# Patient Record
Sex: MALE | Race: OTHER | HISPANIC OR LATINO | Employment: FULL TIME | URBAN - METROPOLITAN AREA
[De-identification: names, ages, dates, MRNs, and addresses within clinical notes are randomized per-mention and may not be internally consistent; named-entity substitution may affect disease eponyms.]

---

## 2018-08-07 ENCOUNTER — HOSPITAL ENCOUNTER (EMERGENCY)
Facility: HOSPITAL | Age: 28
Discharge: HOME | End: 2018-08-07

## 2018-08-07 VITALS
OXYGEN SATURATION: 97 % | TEMPERATURE: 99 F | HEART RATE: 62 BPM | RESPIRATION RATE: 16 BRPM | WEIGHT: 176.6 LBS | DIASTOLIC BLOOD PRESSURE: 83 MMHG | SYSTOLIC BLOOD PRESSURE: 163 MMHG

## 2018-08-07 DIAGNOSIS — R51.9 HEADACHE: Primary | ICD-10-CM

## 2018-08-07 DIAGNOSIS — R11.2 NAUSEA & VOMITING: ICD-10-CM

## 2018-08-07 LAB
ALBUMIN SERPL BCP-MCNC: 3.9 G/DL (ref 3.5–5)
ALP SERPL-CCNC: 88 U/L (ref 46–116)
ALT SERPL W P-5'-P-CCNC: 37 U/L (ref 12–78)
ANION GAP SERPL CALCULATED.3IONS-SCNC: 8 MMOL/L (ref 4–13)
AST SERPL W P-5'-P-CCNC: 23 U/L (ref 5–45)
BASOPHILS # BLD AUTO: 0.02 THOUSANDS/ΜL (ref 0–0.1)
BASOPHILS NFR BLD AUTO: 0 % (ref 0–1)
BILIRUB SERPL-MCNC: 0.59 MG/DL (ref 0.2–1)
BUN SERPL-MCNC: 21 MG/DL (ref 5–25)
CALCIUM SERPL-MCNC: 8.9 MG/DL (ref 8.3–10.1)
CHLORIDE SERPL-SCNC: 102 MMOL/L (ref 100–108)
CO2 SERPL-SCNC: 29 MMOL/L (ref 21–32)
CREAT SERPL-MCNC: 1.16 MG/DL (ref 0.6–1.3)
EOSINOPHIL # BLD AUTO: 0.16 THOUSAND/ΜL (ref 0–0.61)
EOSINOPHIL NFR BLD AUTO: 2 % (ref 0–6)
ERYTHROCYTE [DISTWIDTH] IN BLOOD BY AUTOMATED COUNT: 12.8 % (ref 11.6–15.1)
GFR SERPL CREATININE-BSD FRML MDRD: 86 ML/MIN/1.73SQ M
GLUCOSE SERPL-MCNC: 85 MG/DL (ref 65–140)
HCT VFR BLD AUTO: 42.3 % (ref 36.5–49.3)
HGB BLD-MCNC: 14.2 G/DL (ref 12–17)
LYMPHOCYTES # BLD AUTO: 2.23 THOUSANDS/ΜL (ref 0.6–4.47)
LYMPHOCYTES NFR BLD AUTO: 30 % (ref 14–44)
MCH RBC QN AUTO: 29.3 PG (ref 26.8–34.3)
MCHC RBC AUTO-ENTMCNC: 33.6 G/DL (ref 31.4–37.4)
MCV RBC AUTO: 87 FL (ref 82–98)
MONOCYTES # BLD AUTO: 0.51 THOUSAND/ΜL (ref 0.17–1.22)
MONOCYTES NFR BLD AUTO: 7 % (ref 4–12)
NEUTROPHILS # BLD AUTO: 4.47 THOUSANDS/ΜL (ref 1.85–7.62)
NEUTS SEG NFR BLD AUTO: 60 % (ref 43–75)
PLATELET # BLD AUTO: 270 THOUSANDS/UL (ref 149–390)
PMV BLD AUTO: 10.7 FL (ref 8.9–12.7)
POTASSIUM SERPL-SCNC: 3.7 MMOL/L (ref 3.5–5.3)
PROT SERPL-MCNC: 7.7 G/DL (ref 6.4–8.2)
RBC # BLD AUTO: 4.84 MILLION/UL (ref 3.88–5.62)
SODIUM SERPL-SCNC: 139 MMOL/L (ref 136–145)
WBC # BLD AUTO: 7.39 THOUSAND/UL (ref 4.31–10.16)

## 2018-08-07 PROCEDURE — 36415 COLL VENOUS BLD VENIPUNCTURE: CPT | Performed by: PHYSICIAN ASSISTANT

## 2018-08-07 PROCEDURE — 99283 EMERGENCY DEPT VISIT LOW MDM: CPT

## 2018-08-07 PROCEDURE — 85025 COMPLETE CBC W/AUTO DIFF WBC: CPT | Performed by: PHYSICIAN ASSISTANT

## 2018-08-07 PROCEDURE — 96375 TX/PRO/DX INJ NEW DRUG ADDON: CPT

## 2018-08-07 PROCEDURE — 96374 THER/PROPH/DIAG INJ IV PUSH: CPT

## 2018-08-07 PROCEDURE — 80053 COMPREHEN METABOLIC PANEL: CPT | Performed by: PHYSICIAN ASSISTANT

## 2018-08-07 PROCEDURE — 96361 HYDRATE IV INFUSION ADD-ON: CPT

## 2018-08-07 RX ORDER — ONDANSETRON 2 MG/ML
4 INJECTION INTRAMUSCULAR; INTRAVENOUS ONCE
Status: COMPLETED | OUTPATIENT
Start: 2018-08-07 | End: 2018-08-07

## 2018-08-07 RX ORDER — DIPHENHYDRAMINE HYDROCHLORIDE 50 MG/ML
25 INJECTION INTRAMUSCULAR; INTRAVENOUS ONCE
Status: COMPLETED | OUTPATIENT
Start: 2018-08-07 | End: 2018-08-07

## 2018-08-07 RX ORDER — KETOROLAC TROMETHAMINE 30 MG/ML
15 INJECTION, SOLUTION INTRAMUSCULAR; INTRAVENOUS ONCE
Status: COMPLETED | OUTPATIENT
Start: 2018-08-07 | End: 2018-08-07

## 2018-08-07 RX ADMIN — DIPHENHYDRAMINE HYDROCHLORIDE 25 MG: 50 INJECTION, SOLUTION INTRAMUSCULAR; INTRAVENOUS at 16:19

## 2018-08-07 RX ADMIN — ONDANSETRON 4 MG: 2 INJECTION INTRAMUSCULAR; INTRAVENOUS at 16:22

## 2018-08-07 RX ADMIN — KETOROLAC TROMETHAMINE 15 MG: 30 INJECTION, SOLUTION INTRAMUSCULAR at 16:21

## 2018-08-07 RX ADMIN — SODIUM CHLORIDE 1000 ML: 0.9 INJECTION, SOLUTION INTRAVENOUS at 16:15

## 2018-08-07 NOTE — DISCHARGE INSTRUCTIONS
Dolor de kevin elsa   LO QUE NECESITA SABER:   El dolor de [place] elsa es un dolor o molestia que comienza de ruth y CHRIS rápidamente  Usted puede tener un dolor de kevin elsa sólo cuando siente estrés o come ciertos alimentos  Otro tipo dolor de kevin elsa puede producirse todos los días y a veces varias veces al día  INSTRUCCIONES SOBRE EL AMARILIS HOSPITALARIA:   Regrese a la ben de emergencias si:   · Usted tiene dolor intenso  · Usted tiene entumecimiento en un lado de kelly claudy o cuerpo  · Usted tiene un dolor de kevin que ocurre después de un golpe en la kevin, abraham caída u otro trauma  · Tiene dolor de [place], está olvidadizo o confundido o tiene dificultad para hablar  · Tiene dolor de [place], rigidez en el alex y [place]  Pregúntele a kelly [place] vitaminas y minerales son adecuados para usted  · Usted tiene un dolor de kevin rachel y está vomitando  · Usted tiene dolor de kevin todos los días y no se [place] aun después de tratarlo  · Jerri cely de [facility] u ocurren nuevos síntomas cuando tiene dolor de [place]  · Usted tiene preguntas o inquietudes acerca de kelly condición o cuidado  Medicamentos:  Es posible que usted necesite alguno de los siguientes:  · Un medicamento con receta para el dolor  podrían ser Albina Sample  El medicamento que recomienda kelly médico dependerá del tipo de dolor de kevin que tenga  Usted necesitará horace medicamentos para el dolor de kevin según las indicaciones para evitar un problema llamado dolor de kevin de rebote  Estos cely de [place] ocurren con el uso regular de analgésicos para los trastornos de dolor de [place]  · AINEs (Analgésicos antiinflamatorios no esteroides) dean el ibuprofeno, ayudan a disminuir la inflamación, el dolor y la [place]  Joselyn medicamento esta disponible con o sin abraham receta médica  Los AINEs pueden causar sangrado estomacal o problemas renales en ciertas personas   Si usted martin un medicamento anticoagulante, siempre pregúntele a kelly médico si los NIYAH son seguros para usted  Siempre stephenie la etiqueta de ana medicamento y [place] instrucciones  · El acetaminofén  [place] el dolor y baja la fiebre  Está disponible sin receta médica  Pregunte la cantidad y la frecuencia con que debe tomarlos  [address]  Stephenie las etiquetas de todos los demás medicamentos que esté usando para saber si también contienen acetaminofén, o pregunte a kelly médico o farmacéutico  El acetaminofén puede causar daño en el hígado cuando no se martin de forma correcta  No use más de 3 gramos (3,000 miligramos) en total de acetaminofeno en un día  · Antidepresivos  se pueden administrar para algunos tipos de cely de [place]  · [place] glen medicamentos dean se le haya indicado  Consulte con kelly médico si usted srinath que kelly medicamento no le está ayudando o si presenta efectos secundarios  Infórmele si es alérgico a cualquier medicamento  Mantenga abraham lista actualizada de los [place], las vitaminas y los productos herbales que martin  Incluya los siguientes datos de los medicamentos: cantidad, frecuencia y motivo de administración  Traiga con usted la lista o los envases de la píldoras a glen citas de seguimiento  Lleve la lista de los medicamentos con usted en [place] de abraham emergencia  El manejo de kelly síntomas:   · Aplique hielo o calor  en la juanpablo donde kelly hijo siente el dolor de kevin  Utilice un paquete (compresa) de hielo o calor  Para un paquete de hielo, también puede colocar hielo molido en abraham bolsa plástica  Cubra el paquete de hielo o la bolsa con abraham toalla pequeña antes de aplicarla en la piel  Tanto el hielo dean el calor ayudan a reducir el dolor, y el calor también contribuye a reducir los [employer]  Aplique calor shahzad 20 a 30 minutos cada 2 horas  Aplique hielo shahzad 15 a 20 minutos cada hora  Aplique calor o hielo shahzad el tiempo y la cantidad de días que se le indique   Usted puede alternar el calor y el hielo  · Relaje glen músculos  Acuéstese en abraham posición cómoda y cierre glen ojos  Relaje glen músculos lentamente  Comience por los dedos de los pies y avance hacia arriba al ashley de kelly cuerpo  · Registre en un diario glen cely de [place]  Escriba cuándo comienzan y terminan glen migrañas  Henrry [place] y qué estaba haciendo cuando comenzó la migraña  Registre lo que comió y lo que tomó las 24 horas previas al comienzo de kelly migraña  Kirti Kerbs dolor y dónde le duele: Lleve un registro de lo que hizo para tratar kelly Juan Graver y si obtuvo un resultado satisfactorio  Cómo prevenir un dolor de kevin elsa:   · Evite cualquier cosa que provoque un dolor de kevin elsa  Los ejemplos incluyen la exposición a sustancias químicas, las grandes altitudes o no dormir lo suficiente  Cecilia abraham rutina para dormir  Acuéstese y [employer] días a la misma hora  No utilice aparatos electrónicos antes de acostarse  Pueden provocarle un dolor de kevin o impedirle dormir yoli  · No fume  La nicotina y otras sustancias químicas en los cigarrillos y puros pueden desencadenar un dolor de kevin elsa o [place]  Pida información a kelly médico si usted actualmente fuma y necesita ayuda para dejar de fumar  Los cigarrillos electrónicos o tabaco sin humo todavía contienen nicotina  Consulte con kelly médico antes de [employer]  · Limite el consumo de alcohol según le indicaron  El alcohol puede provocar un dolor de kevin elsa o empeorarlo  Si usted tiene cely de [place] de racimo, no bianca alcohol shahzad un episodio  Para otros tipos de cely de [place], pregúntele a kelly proveedor de atención médica si es seguro para usted beber alcohol  Pregunte cuál es la cantidad robles que puede beber y con qué frecuencia  · Ejercítese según indicaciones  El ejercicio puede reducir la tensión y [place] a aliviar el dolor de [place]   Propóngase hacer 30 minutos de [place] todos los días de la [place]  Meza médico puede ayudarle a crear un plan de ejercicios  · Consuma alimentos saludables y variados  [address] frutas, verduras, productos lácteos bajos en grasa, ruchi [place], pescado y frijoles cocidos  Meza médico o dietista puede ayudarle a crear planes de comidas si desea evitar los alimentos que provocan cely de [place]  Acuda a glen consultas de control con meza médico según le indicaron  Traiga meza registro de cely de kevin con usted cuando visite a meza médico  Anote glen preguntas para que se acuerde de hacerlas shahzad glen visitas  © 2017 [address] Information is for End User's use only and may not be sold, redistributed or otherwise used for commercial purposes  All illustrations and images included in CareNotes® are the copyrighted property of A D A M  Inc  or Adrian Morrison  Esta información es sólo para uso en educación  Meza intención no es darle un consejo médico sobre enfermedades o tratamientos  Colsulte con meza Star Jackie farmacéutico antes de seguir cualquier régimen médico para saber si es seguro y efectivo para usted  Náuseas y vómitos agudos   LO QUE NECESITA SABER:   Las náuseas y los vómitos agudos comienzan de forma repentina, [place] rápidamente y machuca un período breve de [place]  INSTRUCCIONES SOBRE EL AMARILIS HOSPITALARIA:   Regrese a la ben de emergencias si:   · Usted nota igor en meza vómito o en glen evacuaciones  · Usted siente un dolor súbito e intenso en el pecho y la parte superior de meza abdomen después de tener vómitos arminda o tratar de vomitar  · Usted tiene el alex y el pecho inflamados  · [employer], tiene frío, sed y sequedad en los ojos y la boca  · Usted está orinando muy poco o nada en absoluto  · Usted tiene debilidad muscular, calambres en las piernas y dificultad para respirar  · Meza corazón late más rápido que de costumbre       · Usted continúa vomitando por más de 48 horas  Pregúntele a kelly Melvina Hue vitaminas y minerales son adecuados para usted  · Usted tiene arcadas secas (vómitos sin que salga nada) frecuentes  · [place] náusea y vómitos no mejoran ni desaparecen después de usar el medicamento  · Usted tiene preguntas o inquietudes acerca de kelly condición o tratamiento  Medicamentos:  Es posible que usted necesite alguno de los siguientes:  · Medicamentos,  se puede administrar para calmarle el estómago y detener glen vómitos  Usted también puede necesitar medicamentos para ayudarlo a sentirse más relajado o para detener las náuseas y los vómitos causados por el mareo por movimiento  · Se usan los estimulantes gastrointestinales  para ayudar a vaciar kelly estómago y los intestinos  Como puede ayudar para reducir las náuseas y el vómito  · [place] glen medicamentos dean se le haya indicado  Consulte con kelly médico si usted srinath que kelly medicamento no le está ayudando o si presenta efectos secundarios  Infórmele si es alérgico a cualquier medicamento  Mantenga abraham lista actualizada de los [employer], las vitaminas y los productos herbales que martin  Incluya los siguientes datos de los medicamentos: cantidad, frecuencia y motivo de administración  Traiga con usted la lista o los envases de la píldoras a glen citas de seguimiento  Lleve la lista de los medicamentos con usted en ind[place] de abraham emergencia  Evite o controle las náuseas y los vómitos agudos:   · No consuma alcohol  El alcohol podría causarle malestar o irritación estomacal  Abraham cantidad elevada de alcohol también puede causar náuseas y vómitos agudos  · Controle el estrés  Los cely de [place] que son el resultado de tensión nerviosa pueden causar náuseas y vómitos  Busque la manera de relajarse y controlar el estrés  Descanse y [employer]  · [employer] líquidos dean se le indique  Los vómitos pueden llevar a la deshidratación   Es importante beber más líquidos para ayudar a reemplazar los fluidos corporales perdidos  Pregunte a meza médico sobre la cantidad de líquido que necesita horace todos los días y cuáles le recomienda  Meza médico podría recomendarle que tome abraham solución de rehidratación oral (SRO)  Las soluciones de rehidratación oral contienen la cantidad [facility] [place], sales y azúcar que necesita para restituir los líquidos que perdió meza organismo  Pregunte qué tipo de solución de rehidratación oral debe usar, qué cantidad debe horace y dónde puede obtenerla  · Ingiera comidas más pequeñas, más a menudo  Coma pequeñas cantidades de comida cada 2 o 3 horas, incluso si no tiene hambre  Jerri náuseas podrían disminuir si tiene comida en el estómago  · Hable con meza médico antes de horace medicamentos de [facility]  Estos medicamentos pueden causar problemas serios si los [place] junto con ciertos medicamentos o si tiene determinadas condiciones médicas  Podrían tener problemas si [place] abraham dosis demasiado ludin o los [place] shahzad más tiempo de lo indicado  Siga las indicaciones de la etiqueta al pie de la [place]  Acuda a jerir consultas de control con meza médico según le indicaron  Anote las preguntas que tenga para no olvidarse de [employer] visitas de seguimiento  © 2017 [address] Information is for End User's use only and may not be sold, redistributed or otherwise used for commercial purposes  All illustrations and images included in CareNotes® are the copyrighted property of A D A M , Inc  or Adrian Morrison  Esta información es sólo para uso en educación  Meza intención no es darle un consejo médico sobre enfermedades o tratamientos  Colsulte con meza Laura Gastelum farmacéutico antes de seguir cualquier régimen médico para saber si es seguro y efectivo para usted  [place] tensional   LO QUE NECESITA SABER:   Los cely de kevin por tensión son por lo general el resultado del estrés, de forzar la vista o de tensión muscular   El dolor podría comenzar en la frente o la parte posterior de la kevin  A menudo el dolor se propaga a toda la kevin, al alex y los hombros  Los medicamentos de [place] son el tratamiento más útil y común para los cely de kevin por tensión  Es posible que el ejercicio, la retroalimentación biológica, la meditación y las técnicas de relajación también contribuyan a calmar el dolor  INSTRUCCIONES SOBRE EL AMARILIS HOSPITALARIA:   Regrese a la ben de emergencias si:   · Usted tiene dolor de kevin repentino que parece diferente o peor que jerri cely de kevin habituales  · Usted tiene dificultad para maia, hablar o moverse  · Usted se desmaya, se siente confundido o tiene abraham convulsión  · Usted tiene dolor de [place], [place] y rigidez en el alex  Pregúntele a kelly Krista Remedies vitaminas y minerales son adecuados para usted  · Jerri cely de [place] siguen empeorando  · Los cely de [place] son tan frecuentes que interfieren con kelly capacidad para hacer kelly trabajo o jerri actividades normales  · Usted debe horace medicamento para los cely de kevin con mayor frecuencia de la indicada por kelly médico     · El dolor de kevin es tan intenso que le causa vómitos  · Usted tiene preguntas o inquietudes acerca de kelly condición o cuidado  Medicamentos:   · AINEs (Analgésicos antiinflamatorios no esteroides) dean el ibuprofeno, ayudan a disminuir la inflamación, el dolor y la fiebre  Joselyn medicamento esta disponible con o sin abraham receta médica  Los AINEs pueden causar sangrado estomacal o problemas renales en ciertas personas  Si usted martin un medicamento anticoagulante, siempre pregúntele a kelly médico si los NIYAH son seguros para usted  Siempre tiarra la etiqueta de joselyn medicamento y [place] instrucciones  · Acetaminofeno:  tyler el dolor y baja la fiebre  Está disponible sin receta médica  Pregunte la cantidad y la frecuencia con que debe tomarlos  [address]   Tiarra las etiquetas de [employer] demás medicamentos que esté usando para saber si también contienen acetaminofén, o pregunte a kelly médico o farmacéutico  El acetaminofén puede causar daño en el hígado cuando no se martin de forma correcta  No use más de 4 gramos (4000 miligramos) en total de acetaminofeno en un día  · [place] glen medicamentos dean se le haya indicado  Consulte con kelly médico si usted srinath que kelly medicamento no le está ayudando o si presenta efectos secundarios  Infórmele si es alérgico a algún medicamento  Mantenga abraham lista actualizada de los [employer], las vitaminas y los productos herbales que martin  Incluya los siguientes datos de los medicamentos: cantidad, frecuencia y motivo de administración  Traiga con usted la lista o los envases de la píldoras a glen citas de seguimiento  Lleve la lista de los medicamentos con usted en india de abraham emergencia  [place] de kelly síntomas:   · Mantenga un registro de glen cely de [place]  Cathlean Ole y [employer] cely de [place] y [employer]  Describa glen síntomas, cómo se siente el dolor, dónde es, y kelly severidad  Registre todo lo que comió o tomó shahzad las 24 horas antes de que comenzara el dolor de [place]  Julian Flemingch kelly registro a glen citas  · Aplique calor según indicaciones  El calor podría calmar el dolor de kevin y los espasmos musculares  Aplíquese calor en el área lesionada shahzad 20 a 30 minutos cada 2 horas shahzad la cantidad de [employer] indiquen  Un baño caliente también puede ayudar a aliviar la tensión muscular y los espasmos  · Aplique hielo según las indicaciones  Es posible que el hielo le alivie el dolor de [place]  Use abraham bolsa con hielo o ponga hielo triturado en abraham bolsa de plástico  Jearld Keen con abraham toalla y aplíquelo sobre el área por 15 a 20 minutos cada hora según indicaciones  Evite los cely de kevin por tensión:   · Evite la tensión muscular  No permanezca en la misma posición shahzad un período prolongado de [place]  Use abraham almohada diferente si se despierta con dolor en los músculos del alex y del hombro  Busque abraham manera de [employer], dean darse un masaje o descansar en abraham habitación tranquila y Kenney Dodrill  · Evite forzar la vista  Asegúrese de tener suficiente sarah para leer, coser o realizar actividades similares  Hágase exámenes de la vista anuales y use glen lentes dean se le indique  · Duerma lo suficiente  Duerma entre 8 y 10 horas cada noche  Establezca un horario para dormir  Acuéstese y levántese a la misma hora todos los días  Puede resultarle útil hacer algo relajante antes de acostarse  No amanda televisión antes de acostarse  · Consuma alimentos saludables y variados  Los alimentos saludables incluyen frutas, verduras, pan integral, productos lácteos bajos en grasa, frijoles, ruchi magras y pescado  No coma alimentos que le provoquen cely de [place]  · Realice actividad física con regularidad  El ejercicio ayuda a aliviar la tensión y los cely de [place]  Pida más información acerca de un plan de ejercicio adecuado para usted  · [address] se le haya indicado  Es probable que usted necesite horace más líquido para prevenir la deshidratación  La deshidratación puede empeorar el dolor de kevin tensional  Pregúntele al médico cuánto líquido debe horace y qué líquidos son mejores para usted  Limite el consumo de cafeína dean se le haya indicado  La cafeína puede empeorar el dolor de kevin tensional     · No consuma alcohol  El alcohol puede desencadenar un dolor de [place]  También puede impedir que los medicamentos detengan kelly dolor de [place]  · No fume  La nicotina y otros químicos en los cigarrillos o cigarros pueden desencadenar un dolor de [place] y [place] pueden provocar daño al pulmón  Pida información a kelly médico si usted actualmente fuma y necesita ayuda para dejar de fumar  Los cigarrillos electrónicos o tabaco sin humo todavía contienen nicotina   Consulte con kelly médico antes de [employer]  Acuda a glen consultas de control con meza médico según le indicaron  Traiga el registro de cely de kevin con usted  Anote glen preguntas para que se acuerde de hacerlas shahzad glen visitas  © 2017 [address] Information is for End User's use only and may not be sold, redistributed or otherwise used for commercial purposes  All illustrations and images included in CareNotes® are the copyrighted property of A D A M , Inc  or Adrian Morrison  Esta información es sólo para uso en educación  Meza intención no es darle un consejo médico sobre enfermedades o tratamientos  Colsulte con meza [place] farmacéutico antes de seguir cualquier régimen médico para saber si es seguro y efectivo para usted

## 2018-08-07 NOTE — ED PROVIDER NOTES
33 y o  Male presents with c o  Headache intermittently for 2 days, multiple episodes of vomiting yesterday with associated nausea  No vomiting today, able to tolerate po  Denies fevers, chills, blurry vision, numbness, tingling, history of migraines or known sick contacts  Does note several areas of "rash or maybe bug bites "   History  Chief Complaint   Patient presents with    Headache     headache started 2 days ago  vomiting yesterday  denies fever/chills  also reports scattered rash  HPI    None       History reviewed  No pertinent past medical history  Past Surgical History:   Procedure Laterality Date    APPENDECTOMY         History reviewed  No pertinent family history  I have reviewed and agree with the history as documented  Social History   Substance Use Topics    Smoking status: Never Smoker    Smokeless tobacco: Never Used    Alcohol use No        Review of Systems   Skin: Positive for rash  Neurological: Positive for headaches  All other systems reviewed and are negative  Physical Exam  Physical Exam   Constitutional: He is oriented to person, place, and time  He appears well-developed and well-nourished  HENT:   Head: Normocephalic and atraumatic  Nose: Nose normal    Mouth/Throat: Oropharynx is clear and moist    Eyes: Conjunctivae are normal    Neck: Normal range of motion  Neck supple  Cardiovascular: Normal rate, regular rhythm, normal heart sounds and intact distal pulses  Pulmonary/Chest: Effort normal and breath sounds normal    Abdominal: Soft  Bowel sounds are normal    Musculoskeletal: Normal range of motion  Neurological: He is alert and oriented to person, place, and time  He displays normal reflexes  No cranial nerve deficit or sensory deficit  He exhibits normal muscle tone  Coordination normal    Skin: Skin is warm and dry  Capillary refill takes less than 2 seconds  Psychiatric: He has a normal mood and affect   His behavior is normal  Nursing note and vitals reviewed  Vital Signs  ED Triage Vitals   Temperature Pulse Respirations Blood Pressure SpO2   08/07/18 1531 08/07/18 1531 08/07/18 1531 08/07/18 1531 08/07/18 1531   99 °F (37 2 °C) 81 18 158/87 99 %      Temp src Heart Rate Source Patient Position - Orthostatic VS BP Location FiO2 (%)   -- 08/07/18 1750 08/07/18 1750 08/07/18 1750 --    Monitor Sitting Left arm       Pain Score       08/07/18 1531       4           Vitals:    08/07/18 1531 08/07/18 1750 08/07/18 1800   BP: 158/87 129/58 163/83   Pulse: 81 68 62   Patient Position - Orthostatic VS:  Sitting        Visual Acuity      ED Medications  Medications   sodium chloride 0 9 % bolus 1,000 mL (0 mL Intravenous Stopped 8/7/18 1705)   ketorolac (TORADOL) injection 15 mg (15 mg Intravenous Given 8/7/18 1621)   ondansetron (ZOFRAN) injection 4 mg (4 mg Intravenous Given 8/7/18 1622)   diphenhydrAMINE (BENADRYL) injection 25 mg (25 mg Intravenous Given 8/7/18 1619)       Diagnostic Studies  Results Reviewed     Procedure Component Value Units Date/Time    Comprehensive metabolic panel [[id number]] Collected:  08/07/18 1614    Lab Status:  Final result Specimen:  Blood from Arm, Right Updated:  08/07/18 1719     Sodium 139 mmol/L      Potassium 3 7 mmol/L      Chloride 102 mmol/L      CO2 29 mmol/L      Anion Gap 8 mmol/L      BUN 21 mg/dL      Creatinine 1 16 mg/dL      Glucose 85 mg/dL      Calcium 8 9 mg/dL      AST 23 U/L      ALT 37 U/L      Alkaline Phosphatase 88 U/L      Total Protein 7 7 g/dL      Albumin 3 9 g/dL      Total Bilirubin 0 59 mg/dL      eGFR 86 ml/min/1 73sq m     Narrative:         National Kidney Disease Education Program recommendations are as follows:  GFR calculation is accurate only with a steady state creatinine  Chronic Kidney disease less than 60 ml/min/1 73 sq  meters  Kidney failure less than 15 ml/min/1 73 sq  meters      CBC and differential [[id number]]  (Normal) Collected:  08/07/18 1614    Lab Status: Final result Specimen:  Blood from Arm, Right Updated:  08/07/18 1629     WBC 7 39 Thousand/uL      RBC 4 84 Million/uL      Hemoglobin 14 2 g/dL      Hematocrit 42 3 %      MCV 87 fL      MCH 29 3 pg      MCHC 33 6 g/dL      RDW 12 8 %      MPV 10 7 fL      Platelets 674 Thousands/uL      Neutrophils Relative 60 %      Lymphocytes Relative 30 %      Monocytes Relative 7 %      Eosinophils Relative 2 %      Basophils Relative 0 %      Neutrophils Absolute 4 47 Thousands/µL      Lymphocytes Absolute 2 23 Thousands/µL      Monocytes Absolute 0 51 Thousand/µL      Eosinophils Absolute 0 16 Thousand/µL      Basophils Absolute 0 02 Thousands/µL                  No orders to display              Procedures  Procedures       Phone Contacts  ED Phone Contact    ED Course                               MDM  Number of Diagnoses or Management Options  Headache: new and requires workup  Nausea & vomiting: minor  Diagnosis management comments: Pt  Is A&Ox3, VSS, afebrile, NAD, non-toxic appearing,  PERRLA, EOMI, neck supple, LS CTA B/L, RRR, abd soft NT/ND, cap refill brisk, no le edema  Will check labs, give 1 L IVF, migraine cocktail and reasess  Pt is feeling much better would like to go home  Will DC home return precautions discussed pt verbalized understanding  Amount and/or Complexity of Data Reviewed  Clinical lab tests: ordered and reviewed      CritCare Time    Disposition  Final diagnoses:   Headache   Nausea & vomiting     Time reflects when diagnosis was documented in both MDM as applicable and the Disposition within this note     Time User Action Codes Description Comment    8/7/2018  5:54 PM Jaylyn Sai Add [R51] Headache     8/7/2018  5:54 PM Jaylyn Sai Add [R11 2] Nausea & vomiting       ED Disposition     ED Disposition Condition Comment    Discharge  Laina Longorialer discharge to home/self care      Condition at discharge: Improved      Follow-up Information     Follow up With Specialties Details Why Contact Info    [facility]    [phone]            There are no discharge medications for this patient  No discharge procedures on file      ED Provider  Electronically Signed by           Creighton Boxer, PA-C  08/09/18 9[facility] AUBREE Gale  08/09/18 [id number]

## 2021-03-06 ENCOUNTER — HOSPITAL ENCOUNTER (EMERGENCY)
Facility: HOSPITAL | Age: 31
Discharge: HOME | End: 2021-03-06
Attending: EMERGENCY MEDICINE

## 2021-03-06 VITALS
HEART RATE: 94 BPM | OXYGEN SATURATION: 98 % | SYSTOLIC BLOOD PRESSURE: 165 MMHG | DIASTOLIC BLOOD PRESSURE: 82 MMHG | WEIGHT: 190.04 LBS | TEMPERATURE: 100 F | RESPIRATION RATE: 16 BRPM

## 2021-03-06 DIAGNOSIS — Z20.822 ENCOUNTER FOR LABORATORY TESTING FOR COVID-19 VIRUS: ICD-10-CM

## 2021-03-06 DIAGNOSIS — J06.9 VIRAL UPPER RESPIRATORY ILLNESS: Primary | ICD-10-CM

## 2021-03-06 LAB — SARS-COV-2 RNA RESP QL NAA+PROBE: POSITIVE

## 2021-03-06 PROCEDURE — 99284 EMERGENCY DEPT VISIT MOD MDM: CPT | Performed by: PHYSICIAN ASSISTANT

## 2021-03-06 PROCEDURE — 99283 EMERGENCY DEPT VISIT LOW MDM: CPT

## 2021-03-06 PROCEDURE — U0003 INFECTIOUS AGENT DETECTION BY NUCLEIC ACID (DNA OR RNA); SEVERE ACUTE RESPIRATORY SYNDROME CORONAVIRUS 2 (SARS-COV-2) (CORONAVIRUS DISEASE [COVID-19]), AMPLIFIED PROBE TECHNIQUE, MAKING USE OF HIGH THROUGHPUT TECHNOLOGIES AS DESCRIBED BY CMS-2020-01-R: HCPCS | Performed by: PHYSICIAN ASSISTANT

## 2021-03-06 PROCEDURE — U0005 INFEC AGEN DETEC AMPLI PROBE: HCPCS | Performed by: PHYSICIAN ASSISTANT

## 2021-03-06 RX ORDER — BENZONATATE 100 MG/1
100 CAPSULE ORAL 3 TIMES DAILY PRN
Qty: 20 CAPSULE | Refills: 0 | Status: SHIPPED | OUTPATIENT
Start: 2021-03-06 | End: 2021-03-13

## 2021-03-06 RX ORDER — ACETAMINOPHEN 325 MG/1
650 TABLET ORAL EVERY 6 HOURS PRN
Qty: 30 TABLET | Refills: 0 | Status: SHIPPED | OUTPATIENT
Start: 2021-03-06 | End: 2021-11-23

## 2021-03-06 RX ORDER — IBUPROFEN 600 MG/1
600 TABLET ORAL EVERY 6 HOURS PRN
Qty: 30 TABLET | Refills: 0 | Status: SHIPPED | OUTPATIENT
Start: 2021-03-06 | End: 2021-11-23

## 2021-03-06 NOTE — ED PROVIDER NOTES
History  Chief Complaint   Patient presents with    Headache     HA and fever subjective fever "felt hot" also pain in feet  also sore throat and cough     +HA x 4 days - taking theraflu and tylenol  Subjective fever yesterday  + cough  No GI upset, no change in smell or taste  No sick contacts  None       History reviewed  No pertinent past medical history  Past Surgical History:   Procedure Laterality Date    APPENDECTOMY         History reviewed  No pertinent family history  I have reviewed and agree with the history as documented  E-Cigarette/Vaping     E-Cigarette/Vaping Substances     Social History     Tobacco Use    Smoking status: Never Smoker    Smokeless tobacco: Never Used   Substance Use Topics    Alcohol use: No    Drug use: No       Review of Systems   Respiratory: Positive for cough  Negative for shortness of breath and wheezing  Cardiovascular: Negative  Gastrointestinal: Negative for diarrhea, nausea and vomiting  Genitourinary: Negative  Neurological: Positive for headaches  All other systems reviewed and are negative  Physical Exam  Physical Exam  Vitals signs and nursing note reviewed  Constitutional:       Appearance: He is well-developed  HENT:      Head: Normocephalic and atraumatic  Right Ear: External ear normal       Left Ear: External ear normal       Mouth/Throat:      Pharynx: Oropharynx is clear  Eyes:      Conjunctiva/sclera: Conjunctivae normal    Neck:      Musculoskeletal: Normal range of motion and neck supple  Cardiovascular:      Rate and Rhythm: Normal rate and regular rhythm  Heart sounds: Normal heart sounds  Pulmonary:      Effort: Pulmonary effort is normal       Breath sounds: Normal breath sounds  Abdominal:      General: Bowel sounds are normal       Palpations: Abdomen is soft  Musculoskeletal: Normal range of motion  Skin:     General: Skin is warm  Findings: No rash     Neurological:      Mental Status: He is alert and oriented to person, place, and time  Motor: No abnormal muscle tone  Coordination: Coordination normal    Psychiatric:         Behavior: Behavior normal          Vital Signs  ED Triage Vitals [03/06/21 1128]   Temperature Pulse Respirations Blood Pressure SpO2   100 °F (37 8 °C) 94 16 165/82 98 %      Temp Source Heart Rate Source Patient Position - Orthostatic VS BP Location FiO2 (%)   Oral -- Sitting Right arm --      Pain Score       --           Vitals:    03/06/21 1128   BP: 165/82   Pulse: 94   Patient Position - Orthostatic VS: Sitting         Visual Acuity      ED Medications  Medications - No data to display    Diagnostic Studies  Results Reviewed     Procedure Component Value Units Date/Time    [id number]  (Abnormal) Collected: 03/06/21 1138    Lab Status: Final result Specimen: Nasopharyngeal Swab Updated: 03/06/21 1248     SARS-CoV-2 Positive    Narrative: The specimen collection materials, transport medium, and/or testing methodology utilized in the production of these test results have been proven to be reliable in a limited validation with an abbreviated program under the Emergency Utilization Authorization provided by the FDA  Testing reported as "Presumptive positive" will be confirmed with secondary testing with a reference laboratory to ensure result accuracy  Clinical caution and judgement should be used with the interpretation of these results with consideration of the clinical impression and other laboratory testing  Testing reported as "Positive" or "Negative" has been proven to be accurate according to standard laboratory validation requirements  All testing is performed with control materials showing appropriate reactivity at standard intervals                     No orders to display              Procedures  Procedures         ED Course                                           MDM  Number of Diagnoses or Management Options  Encounter for laboratory testing for COVID-19 virus: new and requires workup  Viral upper respiratory illness: new and requires workup  Risk of Complications, Morbidity, and/or Mortality  General comments: Called pt LMOM with covid test results    Patient Progress  Patient progress: stable      Disposition  Final diagnoses:   Viral upper respiratory illness   Encounter for laboratory testing for COVID-19 virus     Time reflects when diagnosis was documented in both MDM as applicable and the Disposition within this note     Time User Action Codes Description Comment    3/6/2021 11:38 AM Lissett Maxwell [J06 9] Viral upper respiratory illness     3/6/2021 11:38 AM Lissett Maxwell [Z20 [id number]] Encounter for laboratory testing for COVID-19 virus       ED Disposition     ED Disposition Condition Date/Time Comment    Discharge Stable Sat Mar 6, 2021 11:38 AM Erica Guerrero discharge to home/self care  Follow-up Information     Follow up With Specialties Details Why Contact Info    [facility]    [phone]            Discharge Medication List as of 3/6/2021 11:42 AM      START taking these medications    Details   acetaminophen (TYLENOL) 325 mg tablet Take 2 tablets (650 mg total) by mouth every 6 (six) hours as needed for headaches or fever, Starting Sat 3/6/2021, Normal      benzonatate (TESSALON PERLES) 100 mg capsule Take 1 capsule (100 mg total) by mouth 3 (three) times a day as needed for cough for up to 7 days, Starting Sat 3/6/2021, Until Sat 3/13/2021, Normal      ibuprofen (MOTRIN) 600 mg tablet Take 1 tablet (600 mg total) by mouth every 6 (six) hours as needed for mild pain for up to 10 days, Starting Sat 3/6/2021, Until Tue 3/16/2021, Normal           No discharge procedures on file      PDMP Review     None          ED Provider  Electronically Signed by           Susana Amin PA-C  03/06/21 5549

## 2021-03-06 NOTE — Clinical Note
Julissa Liu was seen and treated in our emergency department on 3/6/2021  Diagnosis:     Perla Nati    He may return on this date: You were tested for COVID today  It is important that you quarantine for your results  Anyone you have been in close contact with should also quarantine  If you have any questions or concerns, please don't hesitate to call        Lissett Maxwell PA-C    ______________________________           _______________          _______________  Hospital Representative                              Date                                Time

## 2021-03-06 NOTE — Clinical Note
Erica Guerrero was seen and treated in our emergency department on 3/6/2021  Diagnosis:     Aram Route    He may return on this date: You were tested for COVID today  It is important that you quarantine for your results  Anyone you have been in close contact with should also quarantine  If you have any questions or concerns, please don't hesitate to call        Lissett Maxwell PA-C    ______________________________           _______________          _______________  Hospital Representative                              Date                                Time

## 2021-03-06 NOTE — DISCHARGE INSTRUCTIONS
You were tested for COVID today  It is important that you quarantine for your results  Anyone you have been in close contact with should also quarantine

## 2021-11-23 ENCOUNTER — HOSPITAL ENCOUNTER (EMERGENCY)
Facility: HOSPITAL | Age: 31
Discharge: HOME | End: 2021-11-23
Attending: EMERGENCY MEDICINE | Admitting: EMERGENCY MEDICINE

## 2021-11-23 VITALS
RESPIRATION RATE: 14 BRPM | TEMPERATURE: 98.2 F | WEIGHT: 182.54 LBS | HEART RATE: 72 BPM | OXYGEN SATURATION: 98 % | DIASTOLIC BLOOD PRESSURE: 86 MMHG | SYSTOLIC BLOOD PRESSURE: 149 MMHG

## 2021-11-23 DIAGNOSIS — J06.9 VIRAL URI WITH COUGH: Primary | ICD-10-CM

## 2021-11-23 LAB
FLUAV RNA RESP QL NAA+PROBE: NEGATIVE
FLUBV RNA RESP QL NAA+PROBE: NEGATIVE
RSV RNA RESP QL NAA+PROBE: NEGATIVE
SARS-COV-2 RNA RESP QL NAA+PROBE: NEGATIVE

## 2021-11-23 PROCEDURE — 99284 EMERGENCY DEPT VISIT MOD MDM: CPT | Performed by: PHYSICIAN ASSISTANT

## 2021-11-23 PROCEDURE — 99283 EMERGENCY DEPT VISIT LOW MDM: CPT

## 2021-11-23 PROCEDURE — 0241U HB NFCT DS VIR RESP RNA 4 TRGT: CPT | Performed by: PHYSICIAN ASSISTANT

## 2021-11-23 RX ORDER — IBUPROFEN 800 MG/1
800 TABLET ORAL EVERY 8 HOURS PRN
Qty: 21 TABLET | Refills: 0 | Status: SHIPPED | OUTPATIENT
Start: 2021-11-23

## 2021-11-23 RX ORDER — IBUPROFEN 400 MG/1
800 TABLET ORAL ONCE
Status: COMPLETED | OUTPATIENT
Start: 2021-11-23 | End: 2021-11-23

## 2021-11-23 RX ADMIN — IBUPROFEN 800 MG: 400 TABLET, FILM COATED ORAL at 17:38

## 2023-05-09 ENCOUNTER — HOSPITAL ENCOUNTER (EMERGENCY)
Facility: HOSPITAL | Age: 33
Discharge: HOME | End: 2023-05-09
Attending: EMERGENCY MEDICINE

## 2023-05-09 ENCOUNTER — APPOINTMENT (EMERGENCY)
Dept: RADIOLOGY | Facility: HOSPITAL | Age: 33
End: 2023-05-09

## 2023-05-09 ENCOUNTER — APPOINTMENT (EMERGENCY)
Dept: CT IMAGING | Facility: HOSPITAL | Age: 33
End: 2023-05-09

## 2023-05-09 VITALS
WEIGHT: 171.52 LBS | HEART RATE: 85 BPM | RESPIRATION RATE: 19 BRPM | DIASTOLIC BLOOD PRESSURE: 85 MMHG | SYSTOLIC BLOOD PRESSURE: 144 MMHG | TEMPERATURE: 98.5 F | OXYGEN SATURATION: 97 %

## 2023-05-09 DIAGNOSIS — R11.10 POST-TUSSIVE EMESIS: ICD-10-CM

## 2023-05-09 DIAGNOSIS — R05.2 SUBACUTE COUGH: ICD-10-CM

## 2023-05-09 DIAGNOSIS — R91.8 PULMONARY NODULES: ICD-10-CM

## 2023-05-09 DIAGNOSIS — R59.0 HILAR ADENOPATHY: Primary | ICD-10-CM

## 2023-05-09 LAB
ANION GAP SERPL CALCULATED.3IONS-SCNC: 10 MMOL/L (ref 4–13)
BASOPHILS # BLD AUTO: 0.04 THOUSANDS/ÂΜL (ref 0–0.1)
BASOPHILS NFR BLD AUTO: 0 % (ref 0–1)
BUN SERPL-MCNC: 9 MG/DL (ref 5–25)
CALCIUM SERPL-MCNC: 9.9 MG/DL (ref 8.4–10.2)
CHLORIDE SERPL-SCNC: 100 MMOL/L (ref 96–108)
CO2 SERPL-SCNC: 25 MMOL/L (ref 21–32)
CREAT SERPL-MCNC: 1.05 MG/DL (ref 0.6–1.3)
EOSINOPHIL # BLD AUTO: 0.17 THOUSAND/ÂΜL (ref 0–0.61)
EOSINOPHIL NFR BLD AUTO: 2 % (ref 0–6)
ERYTHROCYTE [DISTWIDTH] IN BLOOD BY AUTOMATED COUNT: 11.8 % (ref 11.6–15.1)
GFR SERPL CREATININE-BSD FRML MDRD: 94 ML/MIN/1.73SQ M
GLUCOSE SERPL-MCNC: 158 MG/DL (ref 65–140)
HCT VFR BLD AUTO: 45.3 % (ref 36.5–49.3)
HGB BLD-MCNC: 15.4 G/DL (ref 12–17)
IMM GRANULOCYTES # BLD AUTO: 0.05 THOUSAND/UL (ref 0–0.2)
IMM GRANULOCYTES NFR BLD AUTO: 1 % (ref 0–2)
LYMPHOCYTES # BLD AUTO: 1.35 THOUSANDS/ÂΜL (ref 0.6–4.47)
LYMPHOCYTES NFR BLD AUTO: 15 % (ref 14–44)
MCH RBC QN AUTO: 29.6 PG (ref 26.8–34.3)
MCHC RBC AUTO-ENTMCNC: 34 G/DL (ref 31.4–37.4)
MCV RBC AUTO: 87 FL (ref 82–98)
MONOCYTES # BLD AUTO: 0.67 THOUSAND/ÂΜL (ref 0.17–1.22)
MONOCYTES NFR BLD AUTO: 7 % (ref 4–12)
NEUTROPHILS # BLD AUTO: 7.03 THOUSANDS/ÂΜL (ref 1.85–7.62)
NEUTS SEG NFR BLD AUTO: 75 % (ref 43–75)
NRBC BLD AUTO-RTO: 0 /100 WBCS
PLATELET # BLD AUTO: 323 THOUSANDS/UL (ref 149–390)
PMV BLD AUTO: 10 FL (ref 8.9–12.7)
POTASSIUM SERPL-SCNC: 3.3 MMOL/L (ref 3.5–5.3)
RBC # BLD AUTO: 5.21 MILLION/UL (ref 3.88–5.62)
SODIUM SERPL-SCNC: 135 MMOL/L (ref 135–147)
WBC # BLD AUTO: 9.31 THOUSAND/UL (ref 4.31–10.16)

## 2023-05-09 RX ORDER — ALBUTEROL SULFATE 2.5 MG/3ML
5 SOLUTION RESPIRATORY (INHALATION) ONCE
Status: COMPLETED | OUTPATIENT
Start: 2023-05-09 | End: 2023-05-09

## 2023-05-09 RX ORDER — ALBUTEROL SULFATE 90 UG/1
1-2 AEROSOL, METERED RESPIRATORY (INHALATION) EVERY 6 HOURS PRN
Qty: 8 G | Refills: 0 | Status: SHIPPED | OUTPATIENT
Start: 2023-05-09

## 2023-05-09 RX ADMIN — ALBUTEROL SULFATE 5 MG: 2.5 SOLUTION RESPIRATORY (INHALATION) at 13:58

## 2023-05-09 RX ADMIN — IPRATROPIUM BROMIDE 0.5 MG: 0.5 SOLUTION RESPIRATORY (INHALATION) at 13:58

## 2023-05-09 NOTE — ED PROVIDER NOTES
History  Chief Complaint   Patient presents with   • Cough     Pt c/o dry cough for about a month denies fevers, runny nose, and congestion  71-year-old male with no past medical history presents with persistent cough and posttussive emesis x1 month  Cough is described as persistent throughout the day at night and interferes with work and sleep  Patient also reports 10 pound weight loss over the past month as well as frequent night sweats  Denies any nausea, chest pain, back pain, abdominal pain, or personal or familial history of malignancy  Up-to-date on childhood vaccinations  Travel to the [facility] 2 months ago  Works as an appliance   Prior to Admission Medications   Prescriptions Last Dose Informant Patient Reported? Taking?   dextromethorphan-guaifenesin (MUCINEX DM)  MG per 12 hr tablet   No Yes   Sig: Take 1 tablet by mouth every 12 (twelve) hours   ibuprofen (MOTRIN) 800 mg tablet   No Yes   Sig: Take 1 tablet (800 mg total) by mouth every 8 (eight) hours as needed for moderate pain      Facility-Administered Medications: None       History reviewed  No pertinent past medical history  Past Surgical History:   Procedure Laterality Date   • APPENDECTOMY         History reviewed  No pertinent family history  I have reviewed and agree with the history as documented  E-Cigarette/Vaping     E-Cigarette/Vaping Substances     Social History     Tobacco Use   • Smoking status: Every Day   • Smokeless tobacco: Never   • Tobacco comments:     Hookah but has stopped since the cough    Substance Use Topics   • Alcohol use: No   • Drug use: No        Review of Systems   Constitutional: Positive for unexpected weight change  Negative for chills and fever  HENT: Negative for ear pain and sore throat  Eyes: Negative for pain and visual disturbance  Respiratory: Positive for cough  Negative for shortness of breath      Cardiovascular: Negative for chest pain and palpitations  Gastrointestinal: Positive for vomiting  Negative for abdominal pain, diarrhea and nausea  Genitourinary: Negative for dysuria and hematuria  Musculoskeletal: Negative for arthralgias and back pain  Skin: Negative for color change and rash  Neurological: Negative for seizures and syncope  All other systems reviewed and are negative  Physical Exam  ED Triage Vitals   Temperature Pulse Respirations Blood Pressure SpO2   05/09/23 1306 05/09/23 1306 05/09/23 1306 05/09/23 1306 05/09/23 1306   98 5 °F (36 9 °C) 103 20 159/100 98 %      Temp src Heart Rate Source Patient Position - Orthostatic VS BP Location FiO2 (%)   -- 05/09/23 1649 05/09/23 1306 05/09/23 1306 --    Monitor Sitting Right arm       Pain Score       --                    Orthostatic Vital Signs  Vitals:    05/09/23 1306 05/09/23 1649   BP: 159/100 144/85   Pulse: 103 85   Patient Position - Orthostatic VS: Sitting Sitting       Physical Exam  Vitals and nursing note reviewed  Constitutional:       General: He is in acute distress  Appearance: He is well-developed  He is ill-appearing  He is not toxic-appearing or diaphoretic  Comments: Persistent coughing throughout exam   HENT:      Head: Normocephalic and atraumatic  Nose: Nose normal       Mouth/Throat:      Mouth: Mucous membranes are moist       Pharynx: Oropharynx is clear  No oropharyngeal exudate or posterior oropharyngeal erythema  Eyes:      General: No scleral icterus  Right eye: No discharge  Left eye: No discharge  Conjunctiva/sclera: Conjunctivae normal    Cardiovascular:      Rate and Rhythm: Normal rate and regular rhythm  Heart sounds: No murmur heard  Pulmonary:      Effort: Pulmonary effort is normal  No respiratory distress  Breath sounds: Normal breath sounds  No stridor  No wheezing, rhonchi or rales  Chest:      Chest wall: No tenderness  Abdominal:      General: Abdomen is flat   There is no distension  Palpations: Abdomen is soft  Tenderness: There is no abdominal tenderness  There is no guarding  Musculoskeletal:         General: No swelling  Normal range of motion  Cervical back: Normal range of motion and neck supple  No rigidity or tenderness  Lymphadenopathy:      Cervical: No cervical adenopathy  Skin:     General: Skin is warm and dry  Capillary Refill: Capillary refill takes less than 2 seconds  Coloration: Skin is not pale  Findings: No rash  Neurological:      Mental Status: He is alert and oriented to person, place, and time     Psychiatric:         Mood and Affect: Mood normal          Behavior: Behavior normal          ED Medications  Medications   albuterol inhalation solution 5 mg (5 mg Nebulization Given 5/9/23 0778)   ipratropium (ATROVENT) 0 02 % inhalation solution 0 5 mg (0 5 mg Nebulization Given 5/9/23 1358)       Diagnostic Studies  Results Reviewed     Procedure Component Value Units Date/Time    Basic metabolic panel [[id number]]  (Abnormal) Collected: 05/09/23 1349    Lab Status: Final result Specimen: Blood from Arm, Left Updated: 05/09/23 1434     Sodium 135 mmol/L      Potassium 3 3 mmol/L      Chloride 100 mmol/L      CO2 25 mmol/L      ANION GAP 10 mmol/L      BUN 9 mg/dL      Creatinine 1 05 mg/dL      Glucose 158 mg/dL      Calcium 9 9 mg/dL      eGFR 94 ml/min/1 73sq m     Narrative:      [place] guidelines for Chronic Kidney Disease (CKD):   •  Stage 1 with normal or high GFR (GFR > 90 mL/min/1 73 square meters)  •  Stage 2 Mild CKD (GFR = 60-89 mL/min/1 73 square meters)  •  Stage 3A Moderate CKD (GFR = 45-59 mL/min/1 73 square meters)  •  Stage 3B Moderate CKD (GFR = 30-44 mL/min/1 73 square meters)  •  Stage 4 Severe CKD (GFR = 15-29 mL/min/1 73 square meters)  •  Stage 5 End Stage CKD (GFR <15 mL/min/1 73 square meters)  Note: GFR calculation is accurate only with a steady state creatinine    CBC and differential [[id number]] Collected: 05/09/23 1349    Lab Status: Final result Specimen: Blood from Arm, Left Updated: 05/09/23 1411     WBC 9 31 Thousand/uL      RBC 5 21 Million/uL      Hemoglobin 15 4 g/dL      Hematocrit 45 3 %      MCV 87 fL      MCH 29 6 pg      MCHC 34 0 g/dL      RDW 11 8 %      MPV 10 0 fL      Platelets 712 Thousands/uL      nRBC 0 /100 WBCs      Neutrophils Relative 75 %      Immat GRANS % 1 %      Lymphocytes Relative 15 %      Monocytes Relative 7 %      Eosinophils Relative 2 %      Basophils Relative 0 %      Neutrophils Absolute 7 03 Thousands/µL      Immature Grans Absolute 0 05 Thousand/uL      Lymphocytes Absolute 1 35 Thousands/µL      Monocytes Absolute 0 67 Thousand/µL      Eosinophils Absolute 0 17 Thousand/µL      Basophils Absolute 0 04 Thousands/µL     Quantiferon TB Gold Plus [[id number]] Collected: 05/09/23 1349    Lab Status: In process Specimen: Blood from Arm, Left Updated: 05/09/23 1407    Bordetella pertussis / parapertussis PCR [[id number]] Collected: 05/09/23 1349    Lab Status: In process Specimen: Nasopharyngeal from Nose Updated: 05/09/23 1407                 CT chest without contrast   Final Result by Justin Kelley MD (05/09 1816)      Bilateral pulmonary nodules and chest and lower neck lymphadenopathy  The differential diagnosis includes neoplastic etiologies, such as metastases and lymphoma and granulomatous etiologies such as sarcoid  Primary lung malignancy and infectious    etiologies are probably less likely  Workstation performed: [id number]         XR chest 2 views   ED Interpretation by Natasha Avalos MD (05/09 1415)   The CXR was interpreted by me independently  - hilar adenopathy  - The  cardiomediastinal  silhouette  is  unremarkable     - The  lungs  are  clear  - No  pleural  effusions   - No  pneumothorax    - The  pulmonary  vasculature  is  within  normal  limits     - The  trachea  is  midline     - Bony  thorax  is  unremarkable       - No previous CXR to compare       Final Result by Jackie Mar MD (05/09 1818)   Lymphadenopathy and pulmonary nodules  Please see today's CT chest report  Workstation performed: [id number]               Procedures  Procedures      ED Course                             SBIRT 20yo+    Flowsheet Row Most Recent Value   Initial Alcohol Screen: US AUDIT-C     1  How often do you have a drink containing alcohol? 0 Filed at: 05/09/2023 1309   2  How many drinks containing alcohol do you have on a typical day you are drinking? 0 Filed at: 05/09/2023 1309   3a  Male UNDER 65: How often do you have five or more drinks on one occasion? 0 Filed at: 05/09/2023 1309   3b  FEMALE Any Age, or MALE 65+: How often do you have 4 or more drinks on one occassion? 0 Filed at: 05/09/2023 1309   Audit-C Score 0 Filed at: 05/09/2023 1309   LUCILA: How many times in the past year have you    Used an illegal drug or used a prescription medication for non-medical reasons? Never Filed at: 05/09/2023 1309                Medical Decision Making  41-year-old male presents with persistent cough and posttussive emesis for over 1 month  Patient also reports unintended weight loss and some night sweats  Patient otherwise appears well with a normal pulmonary exam   Differential diagnosis includes postviral cough syndrome, reactive airway disease cough variant, TB, pertussis, walking pneumonia  Plan: Chest x-ray, CBC, BMP, quantiferon gold, pertussis PCR    Bilateral hilar adenopathy on chest x-ray  Differential diagnosis expanded to sarcoidosis, lymphoma, metastasis  will assess further with CT chest     Patient signed out for final disposition    Dissipate discharge home with follow-up with either family medicine or [facility] pulmonology    Hilar adenopathy: acute illness or injury  Post-tussive emesis: acute illness or injury  Pulmonary nodules: acute illness or injury  Subacute cough: acute illness or injury  Amount and/or Complexity of Data Reviewed  Labs: ordered  Radiology: ordered and independent interpretation performed  Risk  Prescription drug management  Disposition  Final diagnoses:   Hilar adenopathy   Subacute cough   Post-tussive emesis   Pulmonary nodules     Time reflects when diagnosis was documented in both MDM as applicable and the Disposition within this note     Time User Action Codes Description Comment    5/9/2023  2:59 PM Ninetta Larch Add [R59 0] Hilar adenopathy     5/9/2023  2:59 PM Ninetta Larch Add [R05 2] Subacute cough     5/9/2023  2:59 PM Ninetta Larch Add [R11 10] Post-tussive emesis     5/9/2023  6:21 PM Camilla Harps Add [R91 8] Pulmonary nodules       ED Disposition     ED Disposition   Discharge    Condition   Stable    Date/Time   Tue May 9, 2023  6:21 PM    [address] [place] [address] discharge to home/self care                 Follow-up Information     Follow up With Specialties Details Why Contact Info Additional [facility] Schedule an appointment as soon as possible for a visit in 1 week  [address] [place] [phone]  [address] [place] [address], [address] [place] [address] Sola Barr, [place], [place], [address]   [address] [facility] Emergency Department Emergency Medicine Go to  If symptoms worsen [facility] [id number]  [address] [facility] Emergency Department, [facility] , [place], [place], [address]          Discharge Medication List as of 5/9/2023  6:26 PM      START taking these medications    Details   albuterol (Proventil HFA) 90 mcg/act inhaler Inhale 1-2 puffs every 6 (six) hours as needed for wheezing, Starting Tue 5/9/2023, Normal         CONTINUE these medications which have NOT CHANGED    Details   dextromethorphan-guaifenesin (MUCINEX DM)  MG per 12 hr tablet Take 1 tablet by mouth every 12 (twelve) hours, Starting Tue 11/23/2021, Normal      ibuprofen (MOTRIN) 800 mg tablet Take 1 tablet (800 mg total) by mouth every 8 (eight) hours as needed for moderate pain, Starting Tue 11/23/2021, Normal               PDMP Review     None           ED Provider  Attending physically available and evaluated Gila Alyson TURPIN managed the patient along with the ED Attending      Electronically Signed by         Beatriz Hidalgo MD  05/10/23 2714

## 2023-05-09 NOTE — ED NOTES
Pts SO at bedside, [facility] from ER Registration let her back  Pts information is as follows in case of positive TB results    [facility] [place] [address], Phone # [phone]     Allie Shine RN  [phone]

## 2023-05-09 NOTE — DISCHARGE INSTRUCTIONS
"Your CT scan showed: \"Bilateral pulmonary nodules and chest and lower neck lymphadenopathy  The differential diagnosis includes neoplastic etiologies, such as metastases and lymphoma and granulomatous etiologies such as sarcoid  \" Please follow up with pulmonology for further evaluation  Meza tomografía computarizada mostró: \"Nódulos pulmonares bilaterales y linfadenopatía en el pecho y la parte inferior del alex  El diagnóstico diferencial incluye etiologías neoplásicas, dean metástasis y Steven, y etiologías granulomatosas dean sarcoide\"   Por favor, clemencia un seguimiento con neumología para abraham evaluación adicional   "

## 2023-05-10 LAB
B PARAPERT DNA SPEC QL NAA+PROBE: NOT DETECTED
B PERT DNA SPEC QL NAA+PROBE: NOT DETECTED

## 2023-05-11 LAB
GAMMA INTERFERON BACKGROUND BLD IA-ACNC: 0.12 IU/ML
M TB IFN-G BLD-IMP: NEGATIVE
M TB IFN-G CD4+ BCKGRND COR BLD-ACNC: 0.01 IU/ML
M TB IFN-G CD4+ BCKGRND COR BLD-ACNC: 0.01 IU/ML
MITOGEN IGNF BCKGRD COR BLD-ACNC: 1.51 IU/ML

## 2023-05-16 ENCOUNTER — CONSULT (OUTPATIENT)
Dept: PULMONOLOGY | Facility: CLINIC | Age: 33
End: 2023-05-16

## 2023-05-16 ENCOUNTER — PREP FOR PROCEDURE (OUTPATIENT)
Dept: PULMONOLOGY | Facility: CLINIC | Age: 33
End: 2023-05-16

## 2023-05-16 VITALS
OXYGEN SATURATION: 97 % | WEIGHT: 171.2 LBS | TEMPERATURE: 97.3 F | SYSTOLIC BLOOD PRESSURE: 136 MMHG | HEART RATE: 87 BPM | DIASTOLIC BLOOD PRESSURE: 82 MMHG

## 2023-05-16 DIAGNOSIS — R59.0 MEDIASTINAL LYMPHADENOPATHY: Primary | ICD-10-CM

## 2023-05-16 DIAGNOSIS — R59.0 HILAR ADENOPATHY: ICD-10-CM

## 2023-05-16 DIAGNOSIS — R05.2 SUBACUTE COUGH: ICD-10-CM

## 2023-05-16 DIAGNOSIS — R63.4 WEIGHT LOSS: ICD-10-CM

## 2023-05-16 DIAGNOSIS — R91.8 LUNG NODULES: ICD-10-CM

## 2023-05-16 DIAGNOSIS — R91.8 PULMONARY NODULES: ICD-10-CM

## 2023-05-16 RX ORDER — FLUTICASONE FUROATE AND VILANTEROL 100; 25 UG/1; UG/1
1 POWDER RESPIRATORY (INHALATION) DAILY
Qty: 60 BLISTER | Refills: 0 | Status: SHIPPED | COMMUNITY
Start: 2023-05-16 | End: 2023-06-15

## 2023-05-16 NOTE — ASSESSMENT & PLAN NOTE
I reviewed the chest CT scan with the patient and his sister in the room  I explained to them the differential diagnosis including reactive lymph nodes vs infectious process vs inflammatory process such as sarcoidosis vs malignancy especially lymphoma  He has no type B symptoms but he reports some weight loss that could be attributed to being sick recently  I explained to them the work-up that include EBUS to obtain sample and I will showed them a video about the EBUS procedure and explained the procedure very well with potential complications including hypoxia/aspiration, bleeding, fever/infection, and lung injury/pneumothorax  I also would like to get a PET scan to evaluate these nodules and lymph nodes and check if there is any extrathoracic metabolic activity

## 2023-05-16 NOTE — ASSESSMENT & PLAN NOTE
Most likely postviral infectious cough, also could be related to the underlying condition with abnormal CT scan such as sarcoidosis  I gave patient 2 samples of Breo 100/25 and explained to him how to use and he used it correctly in the office

## 2023-05-16 NOTE — ASSESSMENT & PLAN NOTE
This is most likely part of the process leading to mediastinal lymphadenopathy, sarcoidosis could be all most likely the cause but also to consider other granulomatous disease    We will follow on EBUS and PET scan

## 2023-05-16 NOTE — ASSESSMENT & PLAN NOTE
This could be a transient weight loss in the setting of acute viral illness but also to consider more serious weight loss secondary to the pathology seen on CT scan  We will continue to monitor for now

## 2023-05-16 NOTE — PROGRESS NOTES
Consultation - Pulmonary Medicine   Aleksander Oh 33 y o  male MRN: [id number]    Physician Requesting Consult: Patient was referred by ER attending after recent visit  Reason for Consult: Lung nodules and cough    Mediastinal lymphadenopathy  I reviewed the chest CT scan with the patient and his sister in the room  I explained to them the differential diagnosis including reactive lymph nodes vs infectious process vs inflammatory process such as sarcoidosis vs malignancy especially lymphoma  He has no type B symptoms but he reports some weight loss that could be attributed to being sick recently  I explained to them the work-up that include EBUS to obtain sample and I will showed them a video about the EBUS procedure and explained the procedure very well with potential complications including hypoxia/aspiration, bleeding, fever/infection, and lung injury/pneumothorax  I also would like to get a PET scan to evaluate these nodules and lymph nodes and check if there is any extrathoracic metabolic activity  Lung nodules  This is most likely part of the process leading to mediastinal lymphadenopathy, sarcoidosis could be all most likely the cause but also to consider other granulomatous disease  We will follow on EBUS and PET scan    Subacute cough  Most likely postviral infectious cough, also could be related to the underlying condition with abnormal CT scan such as sarcoidosis  I gave patient 2 samples of Breo 100/25 and explained to him how to use and he used it correctly in the office  Weight loss  This could be a transient weight loss in the setting of acute viral illness but also to consider more serious weight loss secondary to the pathology seen on CT scan  We will continue to monitor for now  Diagnoses and all orders for this visit:    Mediastinal lymphadenopathy  -     NM PET CT skull base to mid thigh;  Future    Weight loss    Subacute cough  -     Fluticasone Furoate-Vilanterol (Breo Ellipta) 100-25 mcg/actuation inhaler; Inhale 1 puff daily Rinse mouth after use  Lung nodules    I placed an order for EBUS  We had long discussion with the patient and his sister about how to schedule the above tests specially that he has no insurance currently and we encouraged him to follow with social work to obtain medical assistance     ______________________________________________________________________    HPI:    Rukhsana Cai is a 33 y o  male who presents for evaluation of lung nodules and cough  Patient has no past medical history but few weeks ago he had cough that he describes as dry associated with chest tightness, he went to the emergency room and during work-up he had a chest CT scan that showed lung nodules and other abnormalities so he was referred for follow-up  Patient continues to have dry cough, he denies postnasal drip or allergies history  He denies wheezing or chest pain or shortness of breath  He denies fever chills or night sweats  He denies pruritus  He reports some weight loss over the past few weeks because of decreased appetite and as he was vomiting due to cough but now his appetite is better and he feels that his weight has been stable recently  Otherwise patient denies any sick contacts  Denies travel history  He is not on any medications except for the albuterol that was prescribed in the ED recently  Patient moved from [facility] in [address], he lives with his mother at home, no pets and no exposure to birds, 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  He smokes cigarettes and used to smoke hookah  Patient is Kyrgyz-speaking and I used the interpretation line during this encounter and then after the encounter by medical assistant help and more interpretation      Review of Systems:  Review of Systems   Constitutional: Positive for unexpected weight change  HENT: Negative  Eyes: Negative  Respiratory: Positive for cough  Cardiovascular: Negative  Gastrointestinal: Negative  Endocrine: Negative  Genitourinary: Negative  Musculoskeletal: Negative  Skin: Negative  Allergic/Immunologic: Negative  Neurological: Negative  Hematological: Negative  Psychiatric/Behavioral: Negative  Aside from what is mentioned in the HPI, the review of systems otherwise negative  Current Medications:    Current Outpatient Medications:   •  albuterol (Proventil HFA) 90 mcg/act inhaler, Inhale 1-2 puffs every 6 (six) hours as needed for wheezing, Disp: 8 g, Rfl: 0  •  dextromethorphan-guaifenesin (MUCINEX DM)  MG per 12 hr tablet, Take 1 tablet by mouth every 12 (twelve) hours, Disp: 14 tablet, Rfl: 0  •  ibuprofen (MOTRIN) 800 mg tablet, Take 1 tablet (800 mg total) by mouth every 8 (eight) hours as needed for moderate pain, Disp: 21 tablet, Rfl: 0    Historical Information   History reviewed  No pertinent past medical history  Past Surgical History:   Procedure Laterality Date   • APPENDECTOMY       Social History   Social History     Tobacco Use   Smoking Status Former   • Types: Cigarettes   Smokeless Tobacco Never   Tobacco Comments    Hookah but has stopped since the cough        Occupational history:  No occupational exposure    Family History:   No family history on file    No pertinent family history    PhysicalExamination:  Vitals:   /82 (BP Location: Left arm, Patient Position: Sitting, Cuff Size: Standard)   Pulse 87   Temp (!) 97 3 °F (36 3 °C) (Tympanic)   Wt 77 7 kg (171 lb 3 2 oz)   SpO2 97%     General: alert, not in acute distress  HEENT: PERRL, no icteric sclera or cyanosis, no thrush  Neck: Supple, no lymphadenopathy or thyromegaly, no JVD  Lungs: Equal breath sounds and clear auscultations bilaterally, no wheezing or crackles  Heart: S1S2 regular, no murmurs or gallops  Abdomen: soft, nontender, bowel sounds present  Extremities: no edema, no clubbing or cyanosis  Neuro: Alert and oriented x 3, no focal neurodeficits   Skin: intact, no rashes      Diagnostic Data:  Labs: I personally reviewed the most recent laboratory data pertinent to today's visit    Lab Results   Component Value Date    WBC 9 31 05/09/2023    HGB 15 4 05/09/2023    HCT 45 3 05/09/2023    MCV 87 05/09/2023     05/09/2023     Lab Results   Component Value Date    GLUCOSE 94 12/17/2015    CALCIUM 9 9 05/09/2023     12/17/2015    K 3 3 (L) 05/09/2023    CO2 25 05/09/2023     05/09/2023    BUN 9 05/09/2023    CREATININE 1 05 05/09/2023     No results found for: IGE  Lab Results   Component Value Date    ALT 37 08/07/2018    AST 23 08/07/2018    ALKPHOS 88 08/07/2018    BILITOT 0 45 12/17/2015           Imaging:  I personally reviewed the images on the [facility] system pertinent to today's visit  Chest x-ray reviewed on PACS: Hazy parenchymal nodules, significant bihilar lymphadenopathy      Chest CT scan reviewed on PACS: Normal lung parenchyma except for scattered few nodules bilaterally and significant hilar and mediastinal lymphadenopathy    Chest x-ray from [facility] 2018:  Impression: Normal chest examination        Randall Wheeler MD

## 2023-05-25 ENCOUNTER — TELEPHONE (OUTPATIENT)
Dept: PULMONOLOGY | Facility: CLINIC | Age: 33
End: 2023-05-25

## 2023-05-25 NOTE — TELEPHONE ENCOUNTER
Pt has called in requesting to schedule the procedure EBUS spoken about with Dr Mcintyre during his last office visit  Pt is Chinese speaking   Please advise

## 2023-05-26 NOTE — TELEPHONE ENCOUNTER
I had Carol Zamudio MA call patient  He stated that he applied for medicaid on 05/25/23 and was approved and It goes into effect 06/15/23  I believe we will need to schedule after 06/15/23 if we schedule prior I don't think insurance will pay even for a retro if he has the procedure before the effective date

## 2023-06-01 NOTE — TELEPHONE ENCOUNTER
Lm to pt's sister  Pt's # is out of serv  [facility] is scheduled for 6/28 in [place], nothing to eat before midnight, hospital will call the night before w/ arrival time  Pt must keep appt for pet-scan

## 2023-06-10 DIAGNOSIS — R05.2 SUBACUTE COUGH: ICD-10-CM

## 2023-06-12 RX ORDER — ALBUTEROL SULFATE 90 UG/1
1-2 AEROSOL, METERED RESPIRATORY (INHALATION) EVERY 6 HOURS PRN
Qty: 8 G | Refills: 0 | Status: SHIPPED | OUTPATIENT
Start: 2023-06-12

## 2023-06-12 RX ORDER — FLUTICASONE FUROATE AND VILANTEROL 100; 25 UG/1; UG/1
1 POWDER RESPIRATORY (INHALATION) DAILY
Qty: 60 BLISTER | Refills: 0 | Status: SHIPPED | OUTPATIENT
Start: 2023-06-12 | End: 2023-07-12

## 2023-06-27 ENCOUNTER — HOSPITAL ENCOUNTER (OUTPATIENT)
Dept: NUCLEAR MEDICINE | Facility: HOSPITAL | Age: 33
Discharge: HOME | End: 2023-06-27
Attending: INTERNAL MEDICINE
Payer: COMMERCIAL

## 2023-06-27 DIAGNOSIS — R59.0 MEDIASTINAL LYMPHADENOPATHY: ICD-10-CM

## 2023-06-27 PROCEDURE — 78815 PET IMAGE W/CT SKULL-THIGH: CPT

## 2023-06-27 PROCEDURE — A9552 F18 FDG: HCPCS

## 2023-06-27 PROCEDURE — G1004 CDSM NDSC: HCPCS

## 2023-06-27 PROCEDURE — 82948 REAGENT STRIP/BLOOD GLUCOSE: CPT

## 2023-06-27 RX ORDER — SODIUM CHLORIDE, SODIUM LACTATE, POTASSIUM CHLORIDE, CALCIUM CHLORIDE 600; 310; 30; 20 MG/100ML; MG/100ML; MG/100ML; MG/100ML
125 INJECTION, SOLUTION INTRAVENOUS CONTINUOUS
Status: CANCELLED | OUTPATIENT
Start: 2023-06-27

## 2023-06-27 RX ORDER — ONDANSETRON 2 MG/ML
4 INJECTION INTRAMUSCULAR; INTRAVENOUS ONCE AS NEEDED
Status: CANCELLED | OUTPATIENT
Start: 2023-06-27

## 2023-06-27 RX ORDER — FENTANYL CITRATE/PF 50 MCG/ML
25 SYRINGE (ML) INJECTION
Status: CANCELLED | OUTPATIENT
Start: 2023-06-27

## 2023-06-28 ENCOUNTER — ANESTHESIA EVENT (OUTPATIENT)
Dept: PERIOP | Facility: HOSPITAL | Age: 33
End: 2023-06-28

## 2023-06-28 ENCOUNTER — HOSPITAL ENCOUNTER (OUTPATIENT)
Dept: PERIOP | Facility: HOSPITAL | Age: 33
Setting detail: OUTPATIENT SURGERY
Discharge: HOME | End: 2023-06-28
Attending: INTERNAL MEDICINE | Admitting: INTERNAL MEDICINE
Payer: COMMERCIAL

## 2023-06-28 ENCOUNTER — ANESTHESIA (OUTPATIENT)
Dept: PERIOP | Facility: HOSPITAL | Age: 33
End: 2023-06-28

## 2023-06-28 VITALS
DIASTOLIC BLOOD PRESSURE: 78 MMHG | HEIGHT: 72 IN | HEART RATE: 77 BPM | TEMPERATURE: 97.9 F | SYSTOLIC BLOOD PRESSURE: 118 MMHG | RESPIRATION RATE: 18 BRPM | OXYGEN SATURATION: 97 % | WEIGHT: 158.51 LBS | BODY MASS INDEX: 21.47 KG/M2

## 2023-06-28 DIAGNOSIS — R59.0 MEDIASTINAL LYMPHADENOPATHY: ICD-10-CM

## 2023-06-28 LAB — GLUCOSE SERPL-MCNC: 88 MG/DL (ref 65–140)

## 2023-06-28 PROCEDURE — 88342 IMHCHEM/IMCYTCHM 1ST ANTB: CPT | Performed by: PATHOLOGY

## 2023-06-28 PROCEDURE — 31653 BRONCH EBUS SAMPLNG 3/> NODE: CPT | Performed by: INTERNAL MEDICINE

## 2023-06-28 PROCEDURE — 88173 CYTOPATH EVAL FNA REPORT: CPT | Performed by: PATHOLOGY

## 2023-06-28 PROCEDURE — 88305 TISSUE EXAM BY PATHOLOGIST: CPT | Performed by: PATHOLOGY

## 2023-06-28 PROCEDURE — 31625 BRONCHOSCOPY W/BIOPSY(S): CPT | Performed by: INTERNAL MEDICINE

## 2023-06-28 PROCEDURE — 87205 SMEAR GRAM STAIN: CPT | Performed by: INTERNAL MEDICINE

## 2023-06-28 PROCEDURE — 87116 MYCOBACTERIA CULTURE: CPT | Performed by: INTERNAL MEDICINE

## 2023-06-28 PROCEDURE — 88360 TUMOR IMMUNOHISTOCHEM/MANUAL: CPT | Performed by: PATHOLOGY

## 2023-06-28 PROCEDURE — 88172 CYTP DX EVAL FNA 1ST EA SITE: CPT | Performed by: PATHOLOGY

## 2023-06-28 PROCEDURE — 88312 SPECIAL STAINS GROUP 1: CPT | Performed by: PATHOLOGY

## 2023-06-28 PROCEDURE — 88184 FLOWCYTOMETRY/ TC 1 MARKER: CPT | Performed by: INTERNAL MEDICINE

## 2023-06-28 PROCEDURE — 87206 SMEAR FLUORESCENT/ACID STAI: CPT | Performed by: INTERNAL MEDICINE

## 2023-06-28 PROCEDURE — 88185 FLOWCYTOMETRY/TC ADD-ON: CPT

## 2023-06-28 PROCEDURE — 87070 CULTURE OTHR SPECIMN AEROBIC: CPT | Performed by: INTERNAL MEDICINE

## 2023-06-28 PROCEDURE — 87102 FUNGUS ISOLATION CULTURE: CPT | Performed by: INTERNAL MEDICINE

## 2023-06-28 RX ORDER — PROPOFOL 10 MG/ML
INJECTION, EMULSION INTRAVENOUS AS NEEDED
Status: DISCONTINUED | OUTPATIENT
Start: 2023-06-28 | End: 2023-06-28

## 2023-06-28 RX ORDER — FENTANYL CITRATE/PF 50 MCG/ML
25 SYRINGE (ML) INJECTION
Status: DISCONTINUED | OUTPATIENT
Start: 2023-06-28 | End: 2023-06-28 | Stop reason: HOSPADM

## 2023-06-28 RX ORDER — FENTANYL CITRATE 50 UG/ML
INJECTION, SOLUTION INTRAMUSCULAR; INTRAVENOUS AS NEEDED
Status: DISCONTINUED | OUTPATIENT
Start: 2023-06-28 | End: 2023-06-28

## 2023-06-28 RX ORDER — ONDANSETRON 2 MG/ML
INJECTION INTRAMUSCULAR; INTRAVENOUS AS NEEDED
Status: DISCONTINUED | OUTPATIENT
Start: 2023-06-28 | End: 2023-06-28

## 2023-06-28 RX ORDER — ONDANSETRON 2 MG/ML
4 INJECTION INTRAMUSCULAR; INTRAVENOUS ONCE AS NEEDED
Status: DISCONTINUED | OUTPATIENT
Start: 2023-06-28 | End: 2023-06-28 | Stop reason: HOSPADM

## 2023-06-28 RX ORDER — ROCURONIUM BROMIDE 10 MG/ML
INJECTION, SOLUTION INTRAVENOUS AS NEEDED
Status: DISCONTINUED | OUTPATIENT
Start: 2023-06-28 | End: 2023-06-28

## 2023-06-28 RX ORDER — MIDAZOLAM HYDROCHLORIDE 2 MG/2ML
INJECTION, SOLUTION INTRAMUSCULAR; INTRAVENOUS AS NEEDED
Status: DISCONTINUED | OUTPATIENT
Start: 2023-06-28 | End: 2023-06-28

## 2023-06-28 RX ORDER — SODIUM CHLORIDE, SODIUM LACTATE, POTASSIUM CHLORIDE, CALCIUM CHLORIDE 600; 310; 30; 20 MG/100ML; MG/100ML; MG/100ML; MG/100ML
125 INJECTION, SOLUTION INTRAVENOUS CONTINUOUS
Status: DISCONTINUED | OUTPATIENT
Start: 2023-06-28 | End: 2023-07-02 | Stop reason: HOSPADM

## 2023-06-28 RX ORDER — LIDOCAINE HYDROCHLORIDE 20 MG/ML
INJECTION, SOLUTION EPIDURAL; INFILTRATION; INTRACAUDAL; PERINEURAL AS NEEDED
Status: DISCONTINUED | OUTPATIENT
Start: 2023-06-28 | End: 2023-06-28

## 2023-06-28 RX ORDER — PROPOFOL 10 MG/ML
INJECTION, EMULSION INTRAVENOUS CONTINUOUS PRN
Status: DISCONTINUED | OUTPATIENT
Start: 2023-06-28 | End: 2023-06-28

## 2023-06-28 RX ADMIN — ROCURONIUM BROMIDE 50 MG: 10 INJECTION, SOLUTION INTRAVENOUS at 08:14

## 2023-06-28 RX ADMIN — PROPOFOL 140 MCG/KG/MIN: 10 INJECTION, EMULSION INTRAVENOUS at 08:18

## 2023-06-28 RX ADMIN — PROPOFOL 300 MG: 10 INJECTION, EMULSION INTRAVENOUS at 08:14

## 2023-06-28 RX ADMIN — SUGAMMADEX 144 MG: 100 INJECTION, SOLUTION INTRAVENOUS at 10:10

## 2023-06-28 RX ADMIN — LIDOCAINE HYDROCHLORIDE 100 MG: 20 INJECTION, SOLUTION EPIDURAL; INFILTRATION; INTRACAUDAL; PERINEURAL at 08:14

## 2023-06-28 RX ADMIN — SODIUM CHLORIDE, SODIUM LACTATE, POTASSIUM CHLORIDE, AND CALCIUM CHLORIDE 125 ML/HR: .6; .31; .03; .02 INJECTION, SOLUTION INTRAVENOUS at 06:56

## 2023-06-28 RX ADMIN — FENTANYL CITRATE 50 MCG: 50 INJECTION, SOLUTION INTRAMUSCULAR; INTRAVENOUS at 08:13

## 2023-06-28 RX ADMIN — SODIUM CHLORIDE, SODIUM LACTATE, POTASSIUM CHLORIDE, AND CALCIUM CHLORIDE 125 ML/HR: .6; .31; .03; .02 INJECTION, SOLUTION INTRAVENOUS at 10:27

## 2023-06-28 RX ADMIN — FENTANYL CITRATE 50 MCG: 50 INJECTION, SOLUTION INTRAMUSCULAR; INTRAVENOUS at 08:37

## 2023-06-28 RX ADMIN — MIDAZOLAM 2 MG: 1 INJECTION INTRAMUSCULAR; INTRAVENOUS at 08:08

## 2023-06-28 RX ADMIN — ONDANSETRON 4 MG: 2 INJECTION INTRAMUSCULAR; INTRAVENOUS at 09:21

## 2023-06-28 NOTE — PROGRESS NOTES
OTC cough medication recommended by , given to friend One day work excuse given also, informed if more time off required would have to call office

## 2023-06-28 NOTE — H&P
H&P Exam - Thom Angel 33 y.o. male MRN: [id number]    Unit/Bed#:  Encounter: [id number]    Assessment:    1. Mediastinal adenopathy  - Patient presented in consultation to the pulmonary office 5/16/23 to see Dr. Arn Epley for dry cough and poor appetite associated with CT findings of mediastinal adenopathy.  - Presents today for diagnostic EBUS   - No known latex allergy     Lung nodules  - CT chest 5/9/23 with mediastinal and hialr adenopathy associated with numerous kelly-bcentimeter nodules which likely represent the same process    Plan:  - Proceed with EBUS as scheduled      History of Present Illness   Patient presented to the pulmonary office initially on 5/16/23 after obtaining a CT chest demonstrating enlarged hilar, mediastinal and some esophageal nodules associated with scattered small pulmonary nodules. Concerning for sarcoidosis, less likely lymphoma. He continues to have a dry cough and some fatigue. Otherwise, his only concern this morning is that he has been NPO and is hungry. Overall his appetite has been decreased recently    Patient is primarily Malagasy-speaking. Some ROS was initially obtained in [facility], but  service was utilized for the majority of the history and during the main ROS and physical exam. Informed consent was obtained with  service [id number] Judd Gupta)    Review of Systems   Constitutional: Positive for unexpected weight change. Negative for chills and fever. HENT: Negative for sinus pain and sore throat. Eyes: Negative for pain and visual disturbance. Respiratory: Positive for cough. Negative for shortness of breath. Cardiovascular: Negative for chest pain and palpitations. Gastrointestinal: Negative for abdominal pain and vomiting. Genitourinary: Negative for dysuria and hematuria. Musculoskeletal: Negative for arthralgias and back pain. Skin: Negative for color change and rash. Neurological: Negative for seizures and syncope.    All other systems reviewed and are negative. Historical Information   History reviewed. No pertinent past medical history. Past Surgical History:   Procedure Laterality Date   • APPENDECTOMY       Social History   Social History     Substance and Sexual Activity   Alcohol Use Yes    Comment: 1-2 drinks per week     Social History     Substance and Sexual Activity   Drug Use No     Social History     Tobacco Use   Smoking Status Former   • Types: Cigarettes   Smokeless Tobacco Never   Tobacco Comments    Hookah but has stopped since the cough         E-Cigarette/Vaping Substances       Family History: non-contributory    Meds/Allergies   all medications and allergies reviewed  No Known Allergies    Objective   First Vitals:   Blood Pressure: 133/79 (06/28/23 5451)  Pulse: 81 (06/28/23 0638)  Temp Source: Temporal (06/28/23 6937)  Respirations: 18 (06/28/23 5052)  Height: 6' (182.9 cm) (06/28/23 0549)  Weight - Scale: 71.9 kg (158 lb 8.2 oz) (06/28/23 0638)  SpO2: 96 % (06/28/23 0638)    Current Vitals:   Blood Pressure: 133/79 (06/28/23 0638)  Pulse: 81 (06/28/23 0638)  Temp Source: Temporal (06/28/23 8893)  Respirations: 18 (06/28/23 2326)  Height: 6' (182.9 cm) (06/28/23 2597)  Weight - Scale: 71.9 kg (158 lb 8.2 oz) (06/28/23 0638)  SpO2: 96 % (06/28/23 0638)    No intake or output data in the 24 hours ending 06/28/23 0739    Invasive Devices     Peripheral Intravenous Line  Duration           Peripheral IV -- days    Peripheral IV 06/28/23 Dorsal (posterior); Right Hand <1 day                Physical Exam  Vitals and nursing note reviewed. Constitutional:       General: He is not in acute distress. Appearance: Normal appearance. He is well-developed. HENT:      Head: Normocephalic and atraumatic. Mouth/Throat:      Mouth: Mucous membranes are moist.      Pharynx: No oropharyngeal exudate or posterior oropharyngeal erythema. Eyes:      General: No scleral icterus.      Conjunctiva/sclera: Conjunctivae normal. Cardiovascular:      Rate and Rhythm: Normal rate and regular rhythm. Heart sounds: No murmur heard. No friction rub. No gallop. Pulmonary:      Effort: Pulmonary effort is normal. No respiratory distress. Breath sounds: Normal breath sounds. No wheezing, rhonchi or rales. Abdominal:      Palpations: Abdomen is soft. Tenderness: There is no abdominal tenderness. There is no guarding. Musculoskeletal:         General: No swelling. Cervical back: Neck supple. No tenderness. Right lower leg: No edema. Left lower leg: No edema. Lymphadenopathy:      Cervical: No cervical adenopathy. Skin:     General: Skin is warm and dry. Capillary Refill: Capillary refill takes less than 2 seconds. Neurological:      General: No focal deficit present. Mental Status: He is alert and oriented to person, place, and time. Motor: No weakness. Psychiatric:         Mood and Affect: Mood normal.         Lab Results: Reviewed. Bordatella and quantiferon were negative. CBC and BMP unrevealing. Uncorrected calcium may be on the mildly higher side at 9.9. Imaging: CT chest with enlarged mediastinal, hilar and some esophageal adenopathy from 5/9/23. PET CT reviewed as well and demonstrates FDG avidity in these larger lymph nodes.    EKG, Pathology, and Other Studies: N/A    Code Status: Level 1, full code

## 2023-06-28 NOTE — ANESTHESIA POSTPROCEDURE EVALUATION
Post-Op Assessment Note    CV Status:  Stable    Pain management: adequate     Mental Status:  Alert and awake   Hydration Status:  Euvolemic   PONV Controlled:  Controlled   Airway Patency:  Patent      Post Op Vitals Reviewed: Yes      Staff: Anesthesiologist         No notable events documented    /74   Pulse 88   Temp 98 2 °F (36 8 °C)   Resp 16   Ht 6' (1 829 m)   Wt 71 9 kg (158 lb 8 2 oz)   SpO2 96%   BMI 21 50 kg/m²   BP      Temp      Pulse     Resp      SpO2

## 2023-06-28 NOTE — ANESTHESIA PREPROCEDURE EVALUATION
Procedure:  ENDOBRONCHIAL ULTRASOUND (EBUS)    Relevant Problems   Respiratory   (+) Lung nodules      Other   (+) Mediastinal lymphadenopathy   (+) Subacute cough   (+) Weight loss        Physical Exam    Airway    Mallampati score: II  TM Distance: >3 FB  Neck ROM: full     Dental       Cardiovascular  Rhythm: regular, Rate: normal, Cardiovascular exam normal    Pulmonary  Decreased breath sounds,     Other Findings        Anesthesia Plan  ASA Score- 2     Anesthesia Type- general with ASA Monitors  Additional Monitors:   Airway Plan:           Plan Factors-    Chart reviewed  Patient summary reviewed  Patient is not a current smoker  Patient not instructed to abstain from smoking on day of procedure  There is medical exclusion for perioperative obstructive sleep apnea risk education  Induction- intravenous  Postoperative Plan-     Informed Consent- Anesthetic plan and risks discussed with patient (GF)

## 2023-06-29 LAB — RHODAMINE-AURAMINE STN SPEC: NORMAL

## 2023-06-30 LAB
BACTERIA BRONCH AEROBE CULT: NO GROWTH
GRAM STN SPEC: NORMAL

## 2023-07-03 LAB — FUNGUS SPEC CULT: NORMAL

## 2023-07-03 PROCEDURE — 88305 TISSUE EXAM BY PATHOLOGIST: CPT | Performed by: PATHOLOGY

## 2023-07-03 PROCEDURE — 88172 CYTP DX EVAL FNA 1ST EA SITE: CPT | Performed by: PATHOLOGY

## 2023-07-03 PROCEDURE — 88173 CYTOPATH EVAL FNA REPORT: CPT | Performed by: PATHOLOGY

## 2023-07-03 PROCEDURE — 88360 TUMOR IMMUNOHISTOCHEM/MANUAL: CPT | Performed by: PATHOLOGY

## 2023-07-03 PROCEDURE — 88342 IMHCHEM/IMCYTCHM 1ST ANTB: CPT | Performed by: PATHOLOGY

## 2023-07-03 PROCEDURE — 88312 SPECIAL STAINS GROUP 1: CPT | Performed by: PATHOLOGY

## 2023-07-05 LAB
MYCOBACTERIUM SPEC CULT: NORMAL
RHODAMINE-AURAMINE STN SPEC: NORMAL

## 2023-07-06 ENCOUNTER — TELEPHONE (OUTPATIENT)
Dept: PULMONOLOGY | Facility: CLINIC | Age: 33
End: 2023-07-06

## 2023-07-06 NOTE — TELEPHONE ENCOUNTER
Pt has called in stating he was returning a call, he was left a mssg about insurance he says. That was all he could understand. Please advise as I do not see any recent notes from his call today.

## 2023-07-10 LAB — FUNGUS SPEC CULT: NORMAL

## 2023-07-11 LAB
MYCOBACTERIUM SPEC CULT: NORMAL
RHODAMINE-AURAMINE STN SPEC: NORMAL

## 2023-07-12 LAB
SCAN RESULT: NORMAL
SCAN RESULT: NORMAL

## 2023-07-15 PROBLEM — R05.2 SUBACUTE COUGH: Status: RESOLVED | Noted: 2023-05-16 | Resolved: 2023-07-15

## 2023-07-17 ENCOUNTER — OFFICE VISIT (OUTPATIENT)
Dept: PULMONOLOGY | Facility: CLINIC | Age: 33
End: 2023-07-17
Payer: MEDICARE

## 2023-07-17 VITALS
BODY MASS INDEX: 21.4 KG/M2 | OXYGEN SATURATION: 99 % | SYSTOLIC BLOOD PRESSURE: 158 MMHG | HEART RATE: 52 BPM | WEIGHT: 158 LBS | HEIGHT: 72 IN | DIASTOLIC BLOOD PRESSURE: 80 MMHG

## 2023-07-17 DIAGNOSIS — D86.0 SARCOIDOSIS OF LUNG (HCC): Primary | ICD-10-CM

## 2023-07-17 DIAGNOSIS — R05.3 CHRONIC COUGH: ICD-10-CM

## 2023-07-17 DIAGNOSIS — R91.8 LUNG NODULES: ICD-10-CM

## 2023-07-17 DIAGNOSIS — R59.0 MEDIASTINAL LYMPHADENOPATHY: ICD-10-CM

## 2023-07-17 DIAGNOSIS — R63.4 WEIGHT LOSS: ICD-10-CM

## 2023-07-17 LAB
FUNGUS SPEC CULT: NORMAL
MYCOBACTERIUM SPEC CULT: NORMAL
RHODAMINE-AURAMINE STN SPEC: NORMAL

## 2023-07-17 PROCEDURE — 99215 OFFICE O/P EST HI 40 MIN: CPT | Performed by: INTERNAL MEDICINE

## 2023-07-17 RX ORDER — FLUTICASONE PROPIONATE AND SALMETEROL 250; 50 UG/1; UG/1
1 POWDER RESPIRATORY (INHALATION) 2 TIMES DAILY
Qty: 60 BLISTER | Refills: 6 | Status: SHIPPED | OUTPATIENT
Start: 2023-07-17

## 2023-07-17 NOTE — PROGRESS NOTES
Progress note - Pulmonary Medicine   Sammy Randle 33 y.o. male MRN: [id number]       Impression & Plan:     Sarcoidosis of lung [facility])  Based on results from EBUS and mediastinal lymph nodes samples with none necrotizing granuloma I believe patient has sarcoidosis. His microbiology/cultures were negative for AFB and fungal.  Flow cytometry negative for lymphoma. .  No evidence of extrapulmonary sarcoidosis involvement. I had long discussion with the patient and explained to him about sarcoidosis and prognosis and the natural course of the disease in general.  For now given his minimal symptoms I decided to monitor but I will give inhaled corticosteroids and long-acting beta agonist to help with his dyspnea on exertion and cough with exertion, will start on Advair 250/50 and as needed albuterol. We will check PFTs to have his baseline. We will monitor symptoms, if he continues to have significant weight loss or other symptoms then I will consider treatment with steroids otherwise we will continue to monitor without any therapy. Will check CRP and sed rate and angiotensin-converting enzyme. I made referral to see ophthalmology for eye exam.  Also we need to have EKG on records, unfortunately our EKG machine in the office was down so I will order EKG to be done in the hospital when he gets his PFTs. Patient verbalized understanding of the plan and he will call with any deterioration of his symptoms.     Lung nodules  Small lung nodules in the setting of sarcoidosis, will continue to monitor in the future, most likely repeat CT scan in 6-12 months    Chronic cough  Could be related to sarcoidosis versus cough variant asthma, will start Advair and monitor    Weight loss  He feels that his weight has been stable but I noticed about 8 pounds weight loss since last visit, will continue to monitor  I encourage patient to increase calorie intake and exercise to gain some weight    Mediastinal lymphadenopathy  Secondary to sarcoidosis      Diagnoses and all orders for this visit:    Sarcoidosis of lung ([address])  -     C-reactive protein; Future  -     Complete PFT with post bronchodilator; Future  -     Cancel: POCT ECG  -     Fluticasone-Salmeterol (Advair) 250-50 mcg/dose inhaler; Inhale 1 puff 2 (two) times a day Rinse mouth after use. -     Sedimentation rate, automated; Future  -     Angiotensin converting enzyme; Future  -     Basic metabolic panel; Future  -     Ambulatory Referral to Ophthalmology; Future  -     ECG 12 lead; Future    Mediastinal lymphadenopathy    Lung nodules    Weight loss    Chronic cough  -     C-reactive protein; Future  -     Complete PFT with post bronchodilator; Future  -     Fluticasone-Salmeterol (Advair) 250-50 mcg/dose inhaler; Inhale 1 puff 2 (two) times a day Rinse mouth after use.      ______________________________________________________________________    HPI:    Sammy Randle presents today for follow-up of abnormal chest CT scan with lymphadenopathy. Patient had weight loss with shortness of breath and cough over the past several months and the chest CT scan showed lymphadenopathy and lung nodules, I did the EBUS in the past 2 weeks and he is here for follow-up. Patient feels that his cough and shortness of breath has improved significantly but still gets cough with exertion and dyspnea on exertion sometimes. He denies chest pain, denies fever chills or night sweats, he feels that his weight has been stable since last visit. He still has some decreased appetite. He denies night sweats. Denies rashes. Denies arthralgias. Denies lightheadedness or syncopal attacks. Denies palpitation or chest pain. Sometimes he has blurry vision. In general he feels much better since the procedure.   Patient is a [place] speaker and my MA helped in interpretation    Current Medications:    Current Outpatient Medications:   •  albuterol (Proventil HFA) 90 mcg/act inhaler, Inhale 1-2 puffs every 6 (six) hours as needed for wheezing, Disp: 8 g, Rfl: 0  •  dextromethorphan-guaifenesin (MUCINEX DM)  MG per 12 hr tablet, Take 1 tablet by mouth every 12 (twelve) hours (Patient not taking: Reported on 7/17/2023), Disp: 14 tablet, Rfl: 0  •  Fluticasone Furoate-Vilanterol (Breo Ellipta) 100-25 mcg/actuation inhaler, Inhale 1 puff daily Rinse mouth after use., Disp: 60 blister, Rfl: 0  •  ibuprofen (MOTRIN) 800 mg tablet, Take 1 tablet (800 mg total) by mouth every 8 (eight) hours as needed for moderate pain, Disp: 21 tablet, Rfl: 0    Review of Systems:  Review of Systems   Constitutional: Positive for unexpected weight change. HENT: Negative. Eyes: Negative. Respiratory: Positive for cough and shortness of breath. Cardiovascular: Negative. Gastrointestinal: Negative. Endocrine: Negative. Genitourinary: Negative. Musculoskeletal: Negative. Skin: Negative. Allergic/Immunologic: Negative. Neurological: Negative. Hematological: Negative. Psychiatric/Behavioral: Negative.       Aside from what is mentioned in the HPI, the review of systems is otherwise negative    Past medical history, surgical history, and family history were reviewed and updated as appropriate    Social history updates:  Social History     Tobacco Use   Smoking Status Former   • Types: Cigarettes   Smokeless Tobacco Never   Tobacco Comments    Hookah but has stopped since the cough        PhysicalExamination:  Vitals:   /80 (BP Location: Left arm, Patient Position: Sitting, Cuff Size: Adult)   Pulse (!) 52   Ht 6' (1.829 m)   Wt 71.7 kg (158 lb)   SpO2 99%   BMI 21.43 kg/m²     General: alert, not in acute distress  HEENT: PERRL, no icteric sclera or cyanosis, no thrush  Neck: Supple, no lymphadenopathy or thyromegaly, no JVD  Lungs: Equal breath sounds and clear auscultations bilaterally, no wheezing or crackles  Heart: S1S2 regular, no murmurs or gallops  Abdomen: soft, nontender, bowel sounds present  Extremities: no edema, no clubbing or cyanosis  Neuro: Alert and oriented x 3, no focal neurodeficits   Skin: intact, no rashes    Diagnostic Data:  Labs: I personally reviewed the most recent laboratory data pertinent to today's visit    Lab Results   Component Value Date    WBC 9.31 05/09/2023    HGB 15.4 05/09/2023    HCT 45.3 05/09/2023    MCV 87 05/09/2023     05/09/2023     Lab Results   Component Value Date    SODIUM 135 05/09/2023    K 3.3 (L) 05/09/2023    CO2 25 05/09/2023     05/09/2023    BUN 9 05/09/2023    CREATININE 1.05 05/09/2023    GLUCOSE 94 12/17/2015    CALCIUM 9.9 05/09/2023         Imaging:  I personally reviewed the images on the [facility] system pertinent to today's visit    Chest x-ray reviewed on PACS: Hazy parenchymal nodules, significant bihilar lymphadenopathy        Chest CT scan reviewed on PACS: Normal lung parenchyma except for scattered few nodules bilaterally and significant hilar and mediastinal lymphadenopathy     Chest x-ray from [facility] system 2018: Impression: Normal chest examination      PET scan:  IMPRESSION:     Hypermetabolic mediastinal, bilateral hilar, and upper abdominal lymphadenopathy. Additionally, there are scattered subcentimeter pulmonary nodules and interstitial distribution, some of which are metabolically active on PET. This is almost certainly due   to sarcoidosis given morphological pattern. However, lymphoma is not excluded, and tissue sampling is recommended for confirmation.     The remainder of the scan is unremarkable.     Other studies:  Pathology:  Final Diagnosis   A. Lung, left upper lobe, biopsy:     - Portions of chronically inflamed bronchial wall with reactive appearing squamous metaplasia of epithelium.     - Partially detached aggregated of histiocytes and multinucleated giant cells suggestive for a portion of a        granuloma.      - Special stains for acid fast bacilli and fungal organisms are negative. - No dysplasia or neoplasia identified. Cytology:  Final Diagnosis   A, B, C. Lymph Node, 11R (ThinPrep, smear and cell block preparations)  Benign appearing lymphocytes and bronchial cells seen. Focal changes suggestive of non-necrotizing granulomas. Please see separate report for flow cytometry study results. Please also see microbiology study results. .     Satisfactory for evaluation.         D-E. Lymph Node, 4R (ThinPrep and smear preparations):  Benign appearing lymphocytes and bronchial cells seen. Please see separate report for flow cytometry study results.      Satisfactory for evaluation.      F, G, H. Lymph Node, 7 (ThinPrep, smear and cell block preparations)  Benign appearing lymphocytes and non-necrotizing granulomas. Please see separate report for flow cytometry study results. Please also see microbiology study results. .     Satisfactory for evaluation.      I, J, K. Lymph Node, 11L (ThinPrep, smear and cell block preparations)  Benign appearing lymphocytes and some non-necrotizing granulomas. Please see separate report for flow cytometry study results. Please also see microbiology study results. .     Satisfactory for evaluation.           Savannah Hidalgo MD

## 2023-07-18 NOTE — ASSESSMENT & PLAN NOTE
Small lung nodules in the setting of sarcoidosis, will continue to monitor in the future, most likely repeat CT scan in 6-12 months

## 2023-07-18 NOTE — ASSESSMENT & PLAN NOTE
Based on results from EBUS and mediastinal lymph nodes samples with none necrotizing granuloma I believe patient has sarcoidosis. His microbiology/cultures were negative for AFB and fungal.  Flow cytometry negative for lymphoma. .  No evidence of extrapulmonary sarcoidosis involvement. I had long discussion with the patient and explained to him about sarcoidosis and prognosis and the natural course of the disease in general.  For now given his minimal symptoms I decided to monitor but I will give inhaled corticosteroids and long-acting beta agonist to help with his dyspnea on exertion and cough with exertion, will start on Advair 250/50 and as needed albuterol. We will check PFTs to have his baseline. We will monitor symptoms, if he continues to have significant weight loss or other symptoms then I will consider treatment with steroids otherwise we will continue to monitor without any therapy. Will check CRP and sed rate and angiotensin-converting enzyme. I made referral to see ophthalmology for eye exam.  Also we need to have EKG on records, unfortunately our EKG machine in the office was down so I will order EKG to be done in the hospital when he gets his PFTs. Patient verbalized understanding of the plan and he will call with any deterioration of his symptoms.

## 2023-07-18 NOTE — ASSESSMENT & PLAN NOTE
He feels that his weight has been stable but I noticed about 8 pounds weight loss since last visit, will continue to monitor  I encourage patient to increase calorie intake and exercise to gain some weight

## 2023-07-24 LAB — FUNGUS SPEC CULT: NORMAL

## 2023-07-25 LAB
MYCOBACTERIUM SPEC CULT: NORMAL
RHODAMINE-AURAMINE STN SPEC: NORMAL

## 2023-07-31 LAB — FUNGUS SPEC CULT: NORMAL

## 2023-08-01 LAB
MYCOBACTERIUM SPEC CULT: NORMAL
RHODAMINE-AURAMINE STN SPEC: NORMAL

## 2023-08-08 LAB
MYCOBACTERIUM SPEC CULT: NORMAL
RHODAMINE-AURAMINE STN SPEC: NORMAL

## 2023-08-15 LAB
MYCOBACTERIUM SPEC CULT: NORMAL
RHODAMINE-AURAMINE STN SPEC: NORMAL

## 2023-09-01 ENCOUNTER — APPOINTMENT (OUTPATIENT)
Dept: LAB | Facility: CLINIC | Age: 33
End: 2023-09-01
Payer: COMMERCIAL

## 2023-09-01 DIAGNOSIS — D86.0 SARCOIDOSIS OF LUNG (HCC): ICD-10-CM

## 2023-09-01 DIAGNOSIS — R05.3 CHRONIC COUGH: ICD-10-CM

## 2023-09-01 LAB
ANION GAP SERPL CALCULATED.3IONS-SCNC: 9 MMOL/L
BUN SERPL-MCNC: 14 MG/DL (ref 5–25)
CALCIUM SERPL-MCNC: 9.8 MG/DL (ref 8.4–10.2)
CHLORIDE SERPL-SCNC: 101 MMOL/L (ref 96–108)
CO2 SERPL-SCNC: 27 MMOL/L (ref 21–32)
CREAT SERPL-MCNC: 1.08 MG/DL (ref 0.6–1.3)
CRP SERPL QL: 19.7 MG/L
ERYTHROCYTE [SEDIMENTATION RATE] IN BLOOD: 38 MM/HOUR (ref 0–14)
GFR SERPL CREATININE-BSD FRML MDRD: 90 ML/MIN/1.73SQ M
GLUCOSE P FAST SERPL-MCNC: 84 MG/DL (ref 65–99)
POTASSIUM SERPL-SCNC: 4.2 MMOL/L (ref 3.5–5.3)
SODIUM SERPL-SCNC: 137 MMOL/L (ref 135–147)

## 2023-09-01 PROCEDURE — 86140 C-REACTIVE PROTEIN: CPT

## 2023-09-01 PROCEDURE — 85652 RBC SED RATE AUTOMATED: CPT

## 2023-09-01 PROCEDURE — 82164 ANGIOTENSIN I ENZYME TEST: CPT

## 2023-09-01 PROCEDURE — 80048 BASIC METABOLIC PNL TOTAL CA: CPT

## 2023-09-01 PROCEDURE — 36415 COLL VENOUS BLD VENIPUNCTURE: CPT

## 2023-09-05 LAB — ACE SERPL-CCNC: 84 U/L (ref 14–82)

## 2023-09-14 ENCOUNTER — TELEPHONE (OUTPATIENT)
Dept: PULMONOLOGY | Facility: CLINIC | Age: 33
End: 2023-09-14

## 2023-09-22 ENCOUNTER — HOSPITAL ENCOUNTER (OUTPATIENT)
Dept: PULMONOLOGY | Facility: HOSPITAL | Age: 33
End: 2023-09-22
Attending: INTERNAL MEDICINE
Payer: COMMERCIAL

## 2023-09-22 DIAGNOSIS — D86.0 SARCOIDOSIS OF LUNG (HCC): ICD-10-CM

## 2023-09-22 DIAGNOSIS — R05.3 CHRONIC COUGH: ICD-10-CM

## 2023-09-22 PROCEDURE — 94729 DIFFUSING CAPACITY: CPT | Performed by: INTERNAL MEDICINE

## 2023-09-22 PROCEDURE — 94060 EVALUATION OF WHEEZING: CPT | Performed by: INTERNAL MEDICINE

## 2023-09-22 PROCEDURE — 94726 PLETHYSMOGRAPHY LUNG VOLUMES: CPT

## 2023-09-22 PROCEDURE — 94729 DIFFUSING CAPACITY: CPT

## 2023-09-22 PROCEDURE — 94060 EVALUATION OF WHEEZING: CPT

## 2023-09-22 PROCEDURE — 94760 N-INVAS EAR/PLS OXIMETRY 1: CPT

## 2023-09-22 PROCEDURE — 94726 PLETHYSMOGRAPHY LUNG VOLUMES: CPT | Performed by: INTERNAL MEDICINE

## 2023-09-22 RX ORDER — ALBUTEROL SULFATE 2.5 MG/3ML
2.5 SOLUTION RESPIRATORY (INHALATION) ONCE
Status: COMPLETED | OUTPATIENT
Start: 2023-09-22 | End: 2023-09-22

## 2023-09-22 RX ADMIN — ALBUTEROL SULFATE 2.5 MG: 2.5 SOLUTION RESPIRATORY (INHALATION) at 07:57

## 2023-10-03 ENCOUNTER — OFFICE VISIT (OUTPATIENT)
Dept: PULMONOLOGY | Facility: CLINIC | Age: 33
End: 2023-10-03
Payer: COMMERCIAL

## 2023-10-03 VITALS
TEMPERATURE: 97.3 F | SYSTOLIC BLOOD PRESSURE: 110 MMHG | BODY MASS INDEX: 20.99 KG/M2 | DIASTOLIC BLOOD PRESSURE: 70 MMHG | HEART RATE: 65 BPM | OXYGEN SATURATION: 99 % | WEIGHT: 155 LBS | HEIGHT: 72 IN

## 2023-10-03 DIAGNOSIS — D86.0 SARCOIDOSIS OF LUNG (HCC): Primary | ICD-10-CM

## 2023-10-03 DIAGNOSIS — R63.4 WEIGHT LOSS: ICD-10-CM

## 2023-10-03 DIAGNOSIS — R07.9 CHEST PAIN, UNSPECIFIED TYPE: ICD-10-CM

## 2023-10-03 DIAGNOSIS — R59.0 MEDIASTINAL LYMPHADENOPATHY: ICD-10-CM

## 2023-10-03 DIAGNOSIS — R05.3 CHRONIC COUGH: ICD-10-CM

## 2023-10-03 PROBLEM — R91.8 LUNG NODULES: Status: RESOLVED | Noted: 2023-05-16 | Resolved: 2023-10-03

## 2023-10-03 PROCEDURE — 99215 OFFICE O/P EST HI 40 MIN: CPT | Performed by: INTERNAL MEDICINE

## 2023-10-03 PROCEDURE — 93000 ELECTROCARDIOGRAM COMPLETE: CPT | Performed by: INTERNAL MEDICINE

## 2023-10-03 RX ORDER — PREDNISONE 5 MG/1
5 TABLET ORAL DAILY
Qty: 14 TABLET | Refills: 0 | Status: SHIPPED | OUTPATIENT
Start: 2023-10-03

## 2023-10-03 RX ORDER — PREDNISONE 10 MG/1
30 TABLET ORAL DAILY
Qty: 90 TABLET | Refills: 3 | Status: SHIPPED | OUTPATIENT
Start: 2023-10-03

## 2023-10-03 NOTE — ASSESSMENT & PLAN NOTE
Although patient has no significant symptoms especially the cough that is bothering him improved completely with Breo but he continues to have some weight loss and poor appetite and he has significant changes on his CT scan consistent with sarcoidosis stage II, he reports some other symptoms could be not related including the chest pain and the lightheadedness with headache sometimes. I did EKG today to have it as baseline and he has some may be conduction delay with mildly widened QRS but otherwise appeared normal with repolarization changes. I will check echocardiogram to have as baseline. I referred patient for ophthalmology evaluation last visit but he has not done yet. He was declined by few clinics due to his insurance so I told him to call his insurance and check the local ophthalmologist in network. I educated patient about sarcoidosis again and explained to him my plans to treat him with corticosteroids. We will start with 30 mg prednisone for 1 month then he will taper 20 mg for 2 weeks then 50 mg for 2 weeks then to 10 mg and will stay until I see him next time in 2-3 months. If he remains stable on that then I will start tapering beyond that to the lowest possible dose that keeps him stable and will remain on that for 6-12 months. We will repeat imaging study and PFTs in the future. Patient verbalized understanding of the plan.

## 2023-10-03 NOTE — PROGRESS NOTES
Progress note - Pulmonary Medicine   Jo Martínez 33 y.o. male MRN: [id number]       Impression & Plan:     Sarcoidosis of lung [facility])  Although patient has no significant symptoms especially the cough that is bothering him improved completely with Breo but he continues to have some weight loss and poor appetite and he has significant changes on his CT scan consistent with sarcoidosis stage II, he reports some other symptoms could be not related including the chest pain and the lightheadedness with headache sometimes. I did EKG today to have it as baseline and he has some may be conduction delay with mildly widened QRS but otherwise appeared normal with repolarization changes. I will check echocardiogram to have as baseline. I referred patient for ophthalmology evaluation last visit but he has not done yet. He was declined by few clinics due to his insurance so I told him to call his insurance and check the local ophthalmologist in network. I educated patient about sarcoidosis again and explained to him my plans to treat him with corticosteroids. We will start with 30 mg prednisone for 1 month then he will taper 20 mg for 2 weeks then 50 mg for 2 weeks then to 10 mg and will stay until I see him next time in 2-3 months. If he remains stable on that then I will start tapering beyond that to the lowest possible dose that keeps him stable and will remain on that for 6-12 months. We will repeat imaging study and PFTs in the future. Patient verbalized understanding of the plan. Mediastinal lymphadenopathy  Secondary to sarcoidosis, will follow on future CT scans with treatment as above    Chronic cough  Secondary to sarcoidosis, improved with Breo. Chest pain, unspecified  Very nonspecific chest pains, not sure if related to sarcoidosis, will follow in the future specially after treatment with steroids. I will check echocardiogram as a baseline. Will repeat EKG next visit.     Weight loss  Also could be related to sarcoidosis, will monitor in the future. Return in about 3 months (around 1/3/2024). Diagnoses and all orders for this visit:    Sarcoidosis of lung ([address])  -     Echo complete w/ contrast if indicated; Future  -     predniSONE 10 mg tablet; Take 3 tablets (30 mg total) by mouth daily Take 30 mg (3 pills) daily for 4 weeks then 20 mg daily (2 tablets)  for 4 weeks, after that 15 mg daily (1 tablet and a half ) for 2 weeks then then 10 mg daily ( 1 tablet) stay on till seen. While decreasing the prednisone if you have any worsening symptoms please call the provider  -     predniSONE 5 mg tablet; Take 1 tablet (5 mg total) by mouth daily    Mediastinal lymphadenopathy    Chronic cough    Weight loss    Chest pain, unspecified type  -     Echo complete w/ contrast if indicated; Future  -     POCT ECG      ______________________________________________________________________    HPI:    Campbell Turner presents today for follow-up of pulmonary sarcoidosis with lung nodules and opacities and mediastinal lymphadenopathy diagnosed with EBUS. Patient main complaint was weight loss and chronic cough, he presents today feeling much better as his cough resolved completely on Breo. He denies any shortness of breath or dyspnea on exertion. He is not very active given his age though. He denies wheezing or chest tightness. Over the past couple of months he started to have sharp central lower chest pain that would last less than a minute, it happened 3 times so far and the last one was a month ago, not associated with shortness of breath but associated with headache only. He denies sputum production, denies fever or chills or night sweats, he continues to lose some weight few pounds since last visit decreased appetite. Sometimes he gets headaches in the morning and mild lightheadedness. Denies skin rashes or arthralgias.   He is Qatari speaker and my medical assistant helped during this encounter. Current Medications:    Current Outpatient Medications:   •  albuterol (Proventil HFA) 90 mcg/act inhaler, Inhale 1-2 puffs every 6 (six) hours as needed for wheezing, Disp: 8 g, Rfl: 0  •  Fluticasone-Salmeterol (Advair) 250-50 mcg/dose inhaler, Inhale 1 puff 2 (two) times a day Rinse mouth after use., Disp: 60 blister, Rfl: 6  •  dextromethorphan-guaifenesin (MUCINEX DM)  MG per 12 hr tablet, Take 1 tablet by mouth every 12 (twelve) hours (Patient not taking: Reported on 2023), Disp: 14 tablet, Rfl: 0  •  ibuprofen (MOTRIN) 800 mg tablet, Take 1 tablet (800 mg total) by mouth every 8 (eight) hours as needed for moderate pain, Disp: 21 tablet, Rfl: 0    Review of Systems:  Review of Systems   Constitutional: Positive for appetite change and unexpected weight change. Negative for fever. HENT: Negative. Negative for ear pain, postnasal drip, rhinorrhea, sneezing, sore throat and trouble swallowing. Eyes: Negative. Respiratory: Negative. Cardiovascular: Positive for chest pain. Gastrointestinal: Negative. Endocrine: Negative. Genitourinary: Negative. Musculoskeletal: Negative. Negative for myalgias. Skin: Negative. Allergic/Immunologic: Negative. Neurological: Positive for headaches. Hematological: Negative. Psychiatric/Behavioral: Negative.       Aside from what is mentioned in the HPI, the review of systems is otherwise negative    Past medical history, surgical history, and family history were reviewed and updated as appropriate    Social history updates:  Social History     Tobacco Use   Smoking Status Former   • Types: Pipe   • Quit date: 2023   • Years since quittin.5   Smokeless Tobacco Never   Tobacco Comments    Hookah but has stopped since the cough        PhysicalExamination:  Vitals:   /70 (BP Location: Left arm, Patient Position: Sitting, Cuff Size: Standard)   Pulse 65   Temp (!) 97.3 °F (36.3 °C) (Tympanic)   Ht 6' (1.829 m) Wt 70.3 kg (155 lb)   SpO2 99%   BMI 21.02 kg/m²     General: alert, not in acute distress  HEENT: PERRL, no icteric sclera or cyanosis, no thrush  Neck: Supple, no lymphadenopathy or thyromegaly, no JVD  Lungs: Equal breath sounds and clear auscultations bilaterally, no wheezing or crackles  Heart: S1S2 regular, no murmurs or gallops  Abdomen: soft, nontender, bowel sounds present  Extremities: no edema, no clubbing or cyanosis  Neuro: Alert and oriented x 3, no focal neurodeficits   Skin: intact, no rashes    Diagnostic Data:  Labs: I personally reviewed the most recent laboratory data pertinent to today's visit    Lab Results   Component Value Date    WBC 9.31 05/09/2023    HGB 15.4 05/09/2023    HCT 45.3 05/09/2023    MCV 87 05/09/2023     05/09/2023     Lab Results   Component Value Date    SODIUM 137 09/01/2023    K 4.2 09/01/2023    CO2 27 09/01/2023     09/01/2023    BUN 14 09/01/2023    CREATININE 1.08 09/01/2023    GLUCOSE 94 12/17/2015    CALCIUM 9.8 09/01/2023     ACE level 84 (elevated)  ESR 38  CRP 19.7    PFT results: The most recent pulmonary function tests were reviewed. 2023:  · Mild restrictive airflow imitation spirometry with possible mild obstruction  · No significant bronchodilator response  · Decreased lung volumes indicating moderate restriction  · Normal diffusion capacity after correction to hemoglobin  · Restrictive and probably mildly obstructive flow-volume loop          Imaging:  I personally reviewed the images on the [facility] system pertinent to today's visit     Chest x-ray reviewed on PACS: Hazy parenchymal nodules, significant bihilar lymphadenopathy        Chest CT scan reviewed on PACS: Normal lung parenchyma except for scattered few nodules bilaterally and significant hilar and mediastinal lymphadenopathy     Chest x-ray from [facility] 2018:  Impression: Normal chest examination        PET scan:  IMPRESSION:     Hypermetabolic mediastinal, bilateral hilar, and upper abdominal lymphadenopathy. Additionally, there are scattered subcentimeter pulmonary nodules and interstitial distribution, some of which are metabolically active on PET. This is almost certainly due   to sarcoidosis given morphological pattern. However, lymphoma is not excluded, and tissue sampling is recommended for confirmation.     The remainder of the scan is unremarkable.     Other studies:  EKG done in office today:  Normal sinus rhythm, 62 bpm, no acute ST/T wave abnormalities except early repolarization in V2 through V4. Normal MD. Mildly widened QRS at 100 ms. Pathology:  Final Diagnosis   A.  Lung, left upper lobe, biopsy:     - Portions of chronically inflamed bronchial wall with reactive appearing squamous metaplasia of epithelium.     - Partially detached aggregated of histiocytes and multinucleated giant cells suggestive for a portion of a        granuloma.     - Special stains for acid fast bacilli and fungal organisms are negative.     - No dysplasia or neoplasia identified.         Cytology:  Final Diagnosis   A, B, C. Lymph Node, 11R (ThinPrep, smear and cell block preparations)  Benign appearing lymphocytes and bronchial cells seen. Focal changes suggestive of non-necrotizing granulomas. Please see separate report for flow cytometry study results. Please also see microbiology study results. .     Satisfactory for evaluation.         D-E. Lymph Node, 4R (ThinPrep and smear preparations):  Benign appearing lymphocytes and bronchial cells seen. Please see separate report for flow cytometry study results.      Satisfactory for evaluation.      F, G, H. Lymph Node, 7 (ThinPrep, smear and cell block preparations)  Benign appearing lymphocytes and non-necrotizing granulomas. Please see separate report for flow cytometry study results. Please also see microbiology study results.  .     Satisfactory for evaluation.      I, J, K. Lymph Node, 11L (ThinPrep, smear and cell block preparations)  Benign appearing lymphocytes and some non-necrotizing granulomas. Please see separate report for flow cytometry study results. Please also see microbiology study results.  .     Satisfactory for evaluation.                 Bandar Bedolla MD  Answers for HPI/ROS submitted by the patient on 10/2/2023  Do you have shortness of breath that occurs with effort or exertion?: No  Do you have ear congestion?: No  Do you have heartburn?: No  Do you have fatigue?: Yes  Do you have nasal congestion?: No  Do you have shortness of breath when lying flat?: No  Do you have shortness of breath when you wake up?: No  Do you have sweats?: No  Have you experienced weight loss?: Yes

## 2023-10-03 NOTE — ASSESSMENT & PLAN NOTE
Very nonspecific chest pains, not sure if related to sarcoidosis, will follow in the future specially after treatment with steroids. I will check echocardiogram as a baseline. Will repeat EKG next visit.

## 2023-11-01 DIAGNOSIS — D86.0 SARCOIDOSIS OF LUNG (HCC): ICD-10-CM

## 2023-11-01 DIAGNOSIS — R05.3 CHRONIC COUGH: ICD-10-CM

## 2023-11-02 ENCOUNTER — TELEPHONE (OUTPATIENT)
Dept: PULMONOLOGY | Facility: CLINIC | Age: 33
End: 2023-11-02

## 2023-11-02 DIAGNOSIS — D86.0 SARCOIDOSIS OF LUNG (HCC): ICD-10-CM

## 2023-11-02 DIAGNOSIS — R05.3 CHRONIC COUGH: ICD-10-CM

## 2023-11-02 DIAGNOSIS — D86.0 SARCOIDOSIS OF LUNG (HCC): Primary | ICD-10-CM

## 2023-11-02 RX ORDER — FLUTICASONE PROPIONATE AND SALMETEROL 250; 50 UG/1; UG/1
1 POWDER RESPIRATORY (INHALATION) 2 TIMES DAILY
Qty: 60 BLISTER | Refills: 0 | Status: SHIPPED | OUTPATIENT
Start: 2023-11-02 | End: 2023-11-02

## 2023-11-02 RX ORDER — PREDNISONE 10 MG/1
30 TABLET ORAL DAILY
Qty: 90 TABLET | Refills: 0 | Status: SHIPPED | OUTPATIENT
Start: 2023-11-02 | End: 2023-11-02

## 2023-11-02 RX ORDER — PREDNISONE 10 MG/1
TABLET ORAL
Qty: 109 TABLET | Refills: 0 | Status: SHIPPED | OUTPATIENT
Start: 2023-11-02 | End: 2024-01-29

## 2023-11-02 RX ORDER — FLUTICASONE PROPIONATE AND SALMETEROL 250; 50 UG/1; UG/1
1 POWDER RESPIRATORY (INHALATION) 2 TIMES DAILY
Qty: 180 BLISTER | Refills: 1 | Status: SHIPPED | OUTPATIENT
Start: 2023-11-02 | End: 2024-04-30

## 2023-11-03 ENCOUNTER — TELEPHONE (OUTPATIENT)
Dept: PULMONOLOGY | Facility: CLINIC | Age: 33
End: 2023-11-03

## 2023-11-03 NOTE — TELEPHONE ENCOUNTER
L/M explaining in Amharic how he should take his prednisone Per Dr. Shanna Zayas 30 mg for 1 month, 20 mg for 2 weeks,15 mg daily until next visit.

## 2023-11-29 ENCOUNTER — HOSPITAL ENCOUNTER (OUTPATIENT)
Dept: NON INVASIVE DIAGNOSTICS | Facility: HOSPITAL | Age: 33
Discharge: HOME | End: 2023-11-29
Attending: INTERNAL MEDICINE
Payer: COMMERCIAL

## 2023-11-29 VITALS
HEIGHT: 72 IN | BODY MASS INDEX: 20.99 KG/M2 | SYSTOLIC BLOOD PRESSURE: 110 MMHG | HEART RATE: 65 BPM | DIASTOLIC BLOOD PRESSURE: 70 MMHG | WEIGHT: 155 LBS

## 2023-11-29 DIAGNOSIS — D86.0 SARCOIDOSIS OF LUNG (HCC): ICD-10-CM

## 2023-11-29 DIAGNOSIS — R07.9 CHEST PAIN, UNSPECIFIED TYPE: ICD-10-CM

## 2023-11-29 LAB
AORTIC ROOT: 2.5 CM
APICAL FOUR CHAMBER EJECTION FRACTION: 69 %
ASCENDING AORTA: 2.6 CM
E WAVE DECELERATION TIME: 155 MS
E/A RATIO: 2.75
FRACTIONAL SHORTENING: 40 (ref 28–44)
INTERVENTRICULAR SEPTUM IN DIASTOLE (PARASTERNAL SHORT AXIS VIEW): 1 CM
INTERVENTRICULAR SEPTUM: 1 CM (ref 0.6–1.1)
LAAS-AP2: 17.9 CM2
LAAS-AP4: 16.9 CM2
LEFT ATRIUM SIZE: 3.4 CM
LEFT ATRIUM VOLUME (MOD BIPLANE): 51 ML
LEFT ATRIUM VOLUME INDEX (MOD BIPLANE): 26.7 ML/M2
LEFT INTERNAL DIMENSION IN SYSTOLE: 2.5 CM (ref 2.1–4)
LEFT VENTRICLE DIASTOLIC VOLUME (MOD BIPLANE): 69 ML
LEFT VENTRICLE SYSTOLIC VOLUME (MOD BIPLANE): 25 ML
LEFT VENTRICULAR INTERNAL DIMENSION IN DIASTOLE: 4.2 CM (ref 3.5–6)
LEFT VENTRICULAR POSTERIOR WALL IN END DIASTOLE: 1 CM
LEFT VENTRICULAR STROKE VOLUME: 57 ML
LV EF: 65 %
LVSV (TEICH): 57 ML
MV E'TISSUE VEL-SEP: 14 CM/S
MV PEAK A VEL: 0.4 M/S
MV PEAK E VEL: 110 CM/S
MV STENOSIS PRESSURE HALF TIME: 45 MS
MV VALVE AREA P 1/2 METHOD: 4.89
RIGHT ATRIUM AREA SYSTOLE A4C: 11 CM2
RIGHT VENTRICLE ID DIMENSION: 3.2 CM
SL CV LEFT ATRIUM LENGTH A2C: 4.6 CM
SL CV PED ECHO LEFT VENTRICLE DIASTOLIC VOLUME (MOD BIPLANE) 2D: 79 ML
SL CV PED ECHO LEFT VENTRICLE SYSTOLIC VOLUME (MOD BIPLANE) 2D: 22 ML
TRICUSPID ANNULAR PLANE SYSTOLIC EXCURSION: 2.4 CM

## 2023-11-29 PROCEDURE — 93306 TTE W/DOPPLER COMPLETE: CPT | Performed by: INTERNAL MEDICINE

## 2023-11-29 PROCEDURE — 93306 TTE W/DOPPLER COMPLETE: CPT

## 2024-07-30 ENCOUNTER — APPOINTMENT (EMERGENCY)
Dept: RADIOLOGY | Facility: HOSPITAL | Age: 34
End: 2024-07-30

## 2024-07-30 ENCOUNTER — HOSPITAL ENCOUNTER (EMERGENCY)
Facility: HOSPITAL | Age: 34
Discharge: HOME | End: 2024-07-30
Attending: EMERGENCY MEDICINE

## 2024-07-30 VITALS
DIASTOLIC BLOOD PRESSURE: 92 MMHG | HEART RATE: 72 BPM | OXYGEN SATURATION: 100 % | WEIGHT: 171.3 LBS | RESPIRATION RATE: 16 BRPM | SYSTOLIC BLOOD PRESSURE: 150 MMHG | BODY MASS INDEX: 23.23 KG/M2 | TEMPERATURE: 98.3 F

## 2024-07-30 DIAGNOSIS — M25.529 ELBOW PAIN: Primary | ICD-10-CM

## 2024-07-30 DIAGNOSIS — M77.9 TENDONITIS: ICD-10-CM

## 2024-07-30 PROCEDURE — 99284 EMERGENCY DEPT VISIT MOD MDM: CPT

## 2024-07-30 PROCEDURE — 96372 THER/PROPH/DIAG INJ SC/IM: CPT

## 2024-07-30 PROCEDURE — 73080 X-RAY EXAM OF ELBOW: CPT

## 2024-07-30 PROCEDURE — 99283 EMERGENCY DEPT VISIT LOW MDM: CPT

## 2024-07-30 RX ORDER — KETOROLAC TROMETHAMINE 30 MG/ML
15 INJECTION, SOLUTION INTRAMUSCULAR; INTRAVENOUS ONCE
Status: COMPLETED | OUTPATIENT
Start: 2024-07-30 | End: 2024-07-30

## 2024-07-30 RX ORDER — NAPROXEN 500 MG/1
500 TABLET ORAL 2 TIMES DAILY WITH MEALS
Qty: 30 TABLET | Refills: 0 | Status: SHIPPED | OUTPATIENT
Start: 2024-07-30

## 2024-07-30 RX ADMIN — KETOROLAC TROMETHAMINE 15 MG: 30 INJECTION, SOLUTION INTRAMUSCULAR; INTRAVENOUS at 11:41

## 2024-07-30 NOTE — Clinical Note
Shravan Palafox was seen and treated in our emergency department on 7/30/2024.                Diagnosis: elbow pain    Shravan  may return to work on return date.    He may return on this date: 07/31/2024    Avoid twisting and lifting heavy objects with the arm.     If you have any questions or concerns, please don't hesitate to call.      Jessee Guzman PA-C    ______________________________           _______________          _______________  Hospital Representative                              Date                                Time

## 2024-07-30 NOTE — DISCHARGE INSTRUCTIONS
Patient vies to follow-up PCP for today's ED visit.  Patient vies follow-up with orthopedics ambulatory referral placed.  Patient advised to take prescribed medication as prescribed. Patient was advised to return to the ED with any worsening symptoms that were explained on discharge including but not limited to chest pain, shortness of breath, irretractable vomiting or diarrhea, vision loss, loss of function, loss of sensation, syncope, hemoptysis, hematochezia, hematemesis, melena, decreased oral intake, feeling ill.     Any worsening swelling or if the patient noticed any redness to the area he should return to the ED.

## 2024-07-30 NOTE — ED PROVIDER NOTES
History  Chief Complaint   Patient presents with    Elbow Pain     Lt elbow pain & lump x 5 day      33-year-old male presented ED with a chief complaint of left elbow pain.  Locates the pain to the medial epicondyle of the left elbow.  Denies any injuries.  States that he does a lot of pulling and twisting at work.  States that he feels like his left abdomen is bigger than his right.  Patient denies taking medication before coming to the ED.  States that the pain is a 9 out of 10.  He stated that any flexion of the elbow gives him pain.  Denies any trauma to the area.  States that the pain has been going on for 5 days.  Denies any chills fevers diaphoresis.  Denies any nausea vomiting.  Stated that he sometimes has some paresthesias in the fifth and fourth digit of the left hand.  Patient denies any changes in range of motion or change in function.  Denies any sensation loss.  States that the pain is sometimes worse when sleeping.Patient denies any chest pain, shortness of breath, vomiting, diarrhea, chills, diaphoresis, fevers, loss of consciousness, syncope, urinary and bowel changes, abdominal pain, visual symptoms, vision loss, loss of function, loss of sensation, decreased oral intake, hemoptysis, hematochezia, hematemesis, melena, confusion.         Prior to Admission Medications   Prescriptions Last Dose Informant Patient Reported? Taking?   Fluticasone-Salmeterol (Advair) 250-50 mcg/dose inhaler   No No   Sig: Inhale 1 puff 2 (two) times a day Rinse mouth after use   albuterol (Proventil HFA) 90 mcg/act inhaler   No No   Sig: Inhale 1-2 puffs every 6 (six) hours as needed for wheezing      Facility-Administered Medications: None       History reviewed. No pertinent past medical history.    Past Surgical History:   Procedure Laterality Date    APPENDECTOMY         History reviewed. No pertinent family history.  I have reviewed and agree with the history as documented.    E-Cigarette/Vaping    E-Cigarette Use  Never User      E-Cigarette/Vaping Substances     Social History     Tobacco Use    Smoking status: Former     Types: Pipe     Quit date: 2023     Years since quittin.3    Smokeless tobacco: Never    Tobacco comments:     Hookah but has stopped since the cough    Vaping Use    Vaping status: Never Used   Substance Use Topics    Alcohol use: Yes     Comment: 1-2 drinks per week    Drug use: No       Review of Systems   Constitutional:  Negative for chills, diaphoresis, fatigue and fever.   HENT:  Negative for congestion, ear discharge, ear pain, postnasal drip, rhinorrhea, sinus pressure, sinus pain and sore throat.    Eyes:  Negative for photophobia and visual disturbance.   Respiratory:  Negative for cough, chest tightness and shortness of breath.    Cardiovascular:  Negative for chest pain and palpitations.   Gastrointestinal:  Negative for abdominal distention, abdominal pain, constipation, diarrhea, nausea and vomiting.   Genitourinary:  Negative for difficulty urinating, dysuria, flank pain, frequency and hematuria.   Musculoskeletal:  Positive for arthralgias. Negative for back pain, joint swelling, myalgias, neck pain and neck stiffness.   Skin:  Negative for rash and wound.   Neurological:  Negative for dizziness, tremors, syncope, facial asymmetry, weakness, light-headedness, numbness and headaches.       Physical Exam  Physical Exam  Vitals and nursing note reviewed.   Constitutional:       General: He is not in acute distress.     Appearance: Normal appearance. He is normal weight. He is not ill-appearing, toxic-appearing or diaphoretic.   HENT:      Head: Normocephalic.      Right Ear: External ear normal.      Left Ear: External ear normal.      Nose: Nose normal.      Mouth/Throat:      Mouth: Mucous membranes are moist.   Eyes:      Conjunctiva/sclera: Conjunctivae normal.   Cardiovascular:      Rate and Rhythm: Normal rate and regular rhythm.      Pulses: Normal pulses.      Heart sounds:  Normal heart sounds.   Pulmonary:      Effort: Pulmonary effort is normal. No respiratory distress.      Breath sounds: Normal breath sounds. No stridor. No wheezing, rhonchi or rales.   Chest:      Chest wall: No tenderness.   Abdominal:      General: Bowel sounds are normal. There is no distension.      Palpations: Abdomen is soft.      Tenderness: There is no abdominal tenderness. There is no right CVA tenderness, left CVA tenderness or guarding.   Musculoskeletal:         General: Tenderness present. No swelling, deformity or signs of injury. Normal range of motion.      Cervical back: Normal range of motion.      Comments: Tenderness palpation over the medial epicondyle of the left elbow.  Patient also having some pain over the ulnar nerve of the left elbow.  No overlying erythema.  There is some minor swelling noted to the area.  No pain to the distal forearm.  Distal pulses are intact.  No function loss on exam.  Patient able to flex and extend elbow with pain.  Able to flex and extend wrist move all fingers.  Will x-ray to further evaluate   Skin:     General: Skin is warm and dry.      Findings: No erythema or rash.   Neurological:      Mental Status: He is alert and oriented to person, place, and time.   Psychiatric:         Mood and Affect: Mood normal.         Vital Signs  ED Triage Vitals [07/30/24 1107]   Temperature Pulse Respirations Blood Pressure SpO2   98.3 °F (36.8 °C) 72 16 150/92 100 %      Temp src Heart Rate Source Patient Position - Orthostatic VS BP Location FiO2 (%)   -- -- -- -- --      Pain Score       9           Vitals:    07/30/24 1107   BP: 150/92   Pulse: 72         Visual Acuity      ED Medications  Medications   ketorolac (TORADOL) injection 15 mg (15 mg Intramuscular Given 7/30/24 1141)       Diagnostic Studies  Results Reviewed       None                   XR elbow 3+ views LEFT   ED Interpretation by Jessee Guzman PA-C (07/30 1238)   No acute osseous abnormalities       Final Result by Ravinder Weeks MD (07/30 1310)      No acute osseous abnormality.         Computerized Assisted Algorithm (CAA) may have been used to analyze all applicable images.         Workstation performed: [id number]                    Procedures  Procedures         ED Course                                 SBIRT 20yo+      Flowsheet Row Most Recent Value   Initial Alcohol Screen: US AUDIT-C     1. How often do you have a drink containing alcohol? 1 Filed at: 07/30/2024 1131   2. How many drinks containing alcohol do you have on a typical day you are drinking?  1 Filed at: 07/30/2024 1131   3a. Male UNDER 65: How often do you have five or more drinks on one occasion? 0 Filed at: 07/30/2024 1131   3b. FEMALE Any Age, or MALE 65+: How often do you have 4 or more drinks on one occassion? 0 Filed at: 07/30/2024 1131   Audit-C Score 2 Filed at: 07/30/2024 1131   LUCILA: How many times in the past year have you...    Used an illegal drug or used a prescription medication for non-medical reasons? Never Filed at: 07/30/2024 1131                      Medical Decision Making  33-year-old male presented ED with a chief complaint of left elbow pain.  Cardiopulmonary exam is benign.  On examination of the left elbow patient has tenderness palpation over the medial epicondyle.  No overlying erythema or joint effusion appreciated.  There is mild swelling noted to the area.  Patient does state that he has a a lot of pulling and twisting while he is working.  X-rays of the left elbow show no acute osseous abnormalities per this writer.  Patient also en fifth and fourth digit of the left hand.  Patient does endorse random paresthesias especially at night.  Tinel's test of the left elbow does elicit pain but does not elicit any paresthesias into the hand.  Patient does have pain with flexion on exam.  Distal pulses are intact.  There is no distal edema into the hand.  Patient does have full range of motion at the arm.  No  "acute osseous abnormalities but pain over the medial epicondyle likely a medial epicondylitis.  Patient given ambulatory referral to orthopedics for follow-up.  Advised patient to use supportive treatment over the next few days with ice rest and gentle range of motion exercises without applying ice directly to skin.  Patient advised to use ibuprofen Tylenol per OTC dosing recommendations for pain relief.  Patient given strict return to ED protocol with any worsening symptoms that were thoroughly explained on discharge.  Disposition was explained with follow-ups.Patient understood diagnosis and treatment plan and had no further questions.  Patient was discharged in stable condition.  Patient was advised to follow-up with her PCP in 1 to 2 days.  Patient was advised to return to the ED with any worsening symptoms that were explained on discharge including but not limited to chest pain, shortness of breath, irretractable vomiting or diarrhea, vision loss, loss of function, loss of sensation, syncope, hemoptysis, hematochezia, hematemesis, melena, decreased oral intake, feeling ill.     Ddx-medial epicondylitis, ulnar nerve entrapment, elbow pain, elbow contusion, fracture, ligament strain.    Portions of the record may have been created with voice recognition software. Occasional wrong word or \"sound a like\" substitutions may have occurred due to the inherent limitations of voice recognition software. Read the chart carefully and recognize, using context, where substitutions have occurred.      Amount and/or Complexity of Data Reviewed  Radiology: ordered and independent interpretation performed.     Details: See MDM    Risk  Prescription drug management.  Risk Details: Risk of worsening symptoms thoroughly explained on discharge including the signs and symptoms of worsening symptoms.  Risk of incomplete follow-up were discussed with the patient.  Patient having full understanding of risks and had no further questions " and was discharged stable condition                 Disposition  Final diagnoses:   Elbow pain   Tendonitis     Time reflects when diagnosis was documented in both MDM as applicable and the Disposition within this note       Time User Action Codes Description Comment    7/30/2024  1:05 PM Jessee Guzman [M25.529] Elbow pain     7/30/2024  1:05 PM Jessee Guzman [M77.9] Tendonitis           ED Disposition       ED Disposition   Discharge    Condition   Stable    Date/Time   Tue Jul 30, 2024 1305    Comment   Shravan Kiddro discharge to home/self care.                   Follow-up Information       Follow up With Specialties Details Why Contact Info Additional Information    [facility] Emergency Department Emergency Medicine   [address] [place] [id number]  [phone] [facility] Emergency Department, [address], [place], [address]    [address] [id number] [phone] [facility], [address] [place] [address], [place], [place], [id number] [phone]            Discharge Medication List as of 7/30/2024  1:08 PM        START taking these medications    Details   naproxen (Naprosyn) 500 mg tablet Take 1 tablet (500 mg total) by mouth 2 (two) times a day with meals, Starting Tue 7/30/2024, Normal           CONTINUE these medications which have NOT CHANGED    Details   albuterol (Proventil HFA) 90 mcg/act inhaler Inhale 1-2 puffs every 6 (six) hours as needed for wheezing, Starting Mon 6/12/2023, Normal      Fluticasone-Salmeterol (Advair) 250-50 mcg/dose inhaler Inhale 1 puff 2 (two) times a day Rinse mouth after use, Starting Thu 11/2/2023, Until Tue 4/30/2024, Normal                 PDMP Review       None            ED Provider  Electronically Signed by             Jessee Guzman PA-C  07/30/24  1822

## 2024-09-17 DIAGNOSIS — D86.0 SARCOIDOSIS OF LUNG (HCC): ICD-10-CM

## 2024-09-17 DIAGNOSIS — R05.3 CHRONIC COUGH: ICD-10-CM

## 2024-09-17 DIAGNOSIS — R05.2 SUBACUTE COUGH: ICD-10-CM

## 2024-09-18 RX ORDER — FLUTICASONE PROPIONATE AND SALMETEROL 250; 50 UG/1; UG/1
1 POWDER RESPIRATORY (INHALATION) 2 TIMES DAILY
Qty: 180 BLISTER | Refills: 1 | Status: SHIPPED | OUTPATIENT
Start: 2024-09-18 | End: 2025-03-17

## 2024-09-18 RX ORDER — ALBUTEROL SULFATE 90 UG/1
1-2 INHALANT RESPIRATORY (INHALATION) EVERY 6 HOURS PRN
Qty: 8 G | Refills: 5 | Status: SHIPPED | OUTPATIENT
Start: 2024-09-18

## 2025-08-04 ENCOUNTER — APPOINTMENT (EMERGENCY)
Dept: RADIOLOGY | Facility: HOSPITAL | Age: 35
End: 2025-08-04

## 2025-08-04 ENCOUNTER — HOSPITAL ENCOUNTER (EMERGENCY)
Facility: HOSPITAL | Age: 35
Discharge: HOME | End: 2025-08-04
Attending: EMERGENCY MEDICINE

## 2025-08-04 VITALS
TEMPERATURE: 98.3 F | OXYGEN SATURATION: 98 % | SYSTOLIC BLOOD PRESSURE: 143 MMHG | HEART RATE: 82 BPM | DIASTOLIC BLOOD PRESSURE: 85 MMHG | RESPIRATION RATE: 16 BRPM | WEIGHT: 178.35 LBS | BODY MASS INDEX: 24.19 KG/M2

## 2025-08-04 DIAGNOSIS — J32.9 SINUSITIS: Primary | ICD-10-CM

## 2025-08-04 PROCEDURE — 71046 X-RAY EXAM CHEST 2 VIEWS: CPT

## 2025-08-04 PROCEDURE — 96372 THER/PROPH/DIAG INJ SC/IM: CPT

## 2025-08-04 PROCEDURE — 99284 EMERGENCY DEPT VISIT MOD MDM: CPT

## 2025-08-04 RX ORDER — FLUTICASONE PROPIONATE 50 MCG
1 SPRAY, SUSPENSION (ML) NASAL DAILY
Status: DISCONTINUED | OUTPATIENT
Start: 2025-08-04 | End: 2025-08-04 | Stop reason: HOSPADM

## 2025-08-04 RX ORDER — CETIRIZINE HYDROCHLORIDE 10 MG/1
10 TABLET ORAL DAILY
Qty: 30 TABLET | Refills: 0 | Status: SHIPPED | OUTPATIENT
Start: 2025-08-04 | End: 2025-09-03

## 2025-08-04 RX ORDER — LORATADINE 10 MG/1
10 TABLET ORAL ONCE
Status: COMPLETED | OUTPATIENT
Start: 2025-08-04 | End: 2025-08-04

## 2025-08-04 RX ORDER — KETOROLAC TROMETHAMINE 30 MG/ML
30 INJECTION, SOLUTION INTRAMUSCULAR; INTRAVENOUS ONCE
Status: COMPLETED | OUTPATIENT
Start: 2025-08-04 | End: 2025-08-04

## 2025-08-04 RX ORDER — FLUTICASONE PROPIONATE 50 MCG
1 SPRAY, SUSPENSION (ML) NASAL DAILY
Qty: 16 G | Refills: 0 | Status: SHIPPED | OUTPATIENT
Start: 2025-08-04

## 2025-08-04 RX ORDER — OXYMETAZOLINE HYDROCHLORIDE 0.05 G/100ML
2 SPRAY NASAL ONCE
Status: COMPLETED | OUTPATIENT
Start: 2025-08-04 | End: 2025-08-04

## 2025-08-04 RX ADMIN — KETOROLAC TROMETHAMINE 30 MG: 30 INJECTION, SOLUTION INTRAMUSCULAR at 18:46

## 2025-08-04 RX ADMIN — FLUTICASONE PROPIONATE 1 SPRAY: 50 SPRAY, METERED NASAL at 18:25

## 2025-08-04 RX ADMIN — AMOXICILLIN AND CLAVULANATE POTASSIUM 1 TABLET: 875; 125 TABLET, FILM COATED ORAL at 18:25

## 2025-08-04 RX ADMIN — LORATADINE 10 MG: 10 TABLET ORAL at 18:25

## 2025-08-04 RX ADMIN — OXYMETAZOLINE HYDROCHLORIDE 2 SPRAY: 0.05 SPRAY NASAL at 18:25
